# Patient Record
Sex: MALE | Race: WHITE | NOT HISPANIC OR LATINO | Employment: OTHER | URBAN - METROPOLITAN AREA
[De-identification: names, ages, dates, MRNs, and addresses within clinical notes are randomized per-mention and may not be internally consistent; named-entity substitution may affect disease eponyms.]

---

## 2023-01-31 ENCOUNTER — APPOINTMENT (INPATIENT)
Dept: RADIOLOGY | Facility: HOSPITAL | Age: 73
End: 2023-01-31

## 2023-01-31 ENCOUNTER — APPOINTMENT (EMERGENCY)
Dept: RADIOLOGY | Facility: HOSPITAL | Age: 73
End: 2023-01-31

## 2023-01-31 ENCOUNTER — HOSPITAL ENCOUNTER (INPATIENT)
Facility: HOSPITAL | Age: 73
LOS: 4 days | Discharge: HOME | End: 2023-02-04
Attending: EMERGENCY MEDICINE | Admitting: INTERNAL MEDICINE

## 2023-01-31 ENCOUNTER — APPOINTMENT (INPATIENT)
Dept: NON INVASIVE DIAGNOSTICS | Facility: HOSPITAL | Age: 73
End: 2023-01-31

## 2023-01-31 DIAGNOSIS — S22.43XA MULTIPLE FRACTURES OF RIBS, BILATERAL, INITIAL ENCOUNTER FOR CLOSED FRACTURE: ICD-10-CM

## 2023-01-31 DIAGNOSIS — R00.0 WIDE-COMPLEX TACHYCARDIA: ICD-10-CM

## 2023-01-31 DIAGNOSIS — Z95.5 STATUS POST INSERTION OF DRUG ELUTING CORONARY ARTERY STENT: Primary | ICD-10-CM

## 2023-01-31 DIAGNOSIS — I46.9 CARDIAC ARREST (HCC): ICD-10-CM

## 2023-01-31 DIAGNOSIS — F19.90 SUBSTANCE USE DISORDER: ICD-10-CM

## 2023-01-31 DIAGNOSIS — I25.10 CORONARY ARTERY DISEASE: ICD-10-CM

## 2023-01-31 PROBLEM — T18.2XXA FOREIGN BODY IN STOMACH: Status: ACTIVE | Noted: 2023-01-31

## 2023-01-31 PROBLEM — I50.9 ACUTE EXACERBATION OF CHF (CONGESTIVE HEART FAILURE) (HCC): Status: ACTIVE | Noted: 2023-01-31

## 2023-01-31 PROBLEM — G93.41 METABOLIC ENCEPHALOPATHY: Status: ACTIVE | Noted: 2023-01-31

## 2023-01-31 PROBLEM — J96.01 ACUTE HYPOXEMIC RESPIRATORY FAILURE (HCC): Status: ACTIVE | Noted: 2023-01-31

## 2023-01-31 PROBLEM — R77.8 ELEVATED TROPONIN: Status: ACTIVE | Noted: 2023-01-31

## 2023-01-31 LAB
2HR DELTA HS TROPONIN: 143 NG/L
2HR DELTA HS TROPONIN: 29 NG/L
4HR DELTA HS TROPONIN: 232 NG/L
4HR DELTA HS TROPONIN: 56 NG/L
ALBUMIN SERPL BCP-MCNC: 4.4 G/DL (ref 3.5–5)
ALP SERPL-CCNC: 103 U/L (ref 46–116)
ALT SERPL W P-5'-P-CCNC: 37 U/L (ref 12–78)
AMPHETAMINES SERPL QL SCN: NEGATIVE
ANION GAP SERPL CALCULATED.3IONS-SCNC: 6 MMOL/L (ref 4–13)
AORTIC ROOT: 3.5 CM
APAP SERPL-MCNC: <2 UG/ML (ref 10–20)
APICAL FOUR CHAMBER EJECTION FRACTION: 33 %
APTT PPP: 24 SECONDS (ref 23–37)
ASCENDING AORTA: 3.1 CM
AST SERPL W P-5'-P-CCNC: 39 U/L (ref 5–45)
ATRIAL RATE: 57 BPM
ATRIAL RATE: 94 BPM
BARBITURATES UR QL: NEGATIVE
BASE EX.OXY STD BLDV CALC-SCNC: 51.2 % (ref 60–80)
BASE EX.OXY STD BLDV CALC-SCNC: 86.7 % (ref 60–80)
BASE EX.OXY STD BLDV CALC-SCNC: 88.2 % (ref 60–80)
BASE EXCESS BLDV CALC-SCNC: -1 MMOL/L
BASE EXCESS BLDV CALC-SCNC: -8.3 MMOL/L
BASE EXCESS BLDV CALC-SCNC: 0.3 MMOL/L
BASOPHILS # BLD AUTO: 0.11 THOUSANDS/ÂΜL (ref 0–0.1)
BASOPHILS NFR BLD AUTO: 1 % (ref 0–1)
BENZODIAZ UR QL: NEGATIVE
BILIRUB SERPL-MCNC: 0.5 MG/DL (ref 0.2–1)
BUN SERPL-MCNC: 13 MG/DL (ref 5–25)
CALCIUM SERPL-MCNC: 8.8 MG/DL (ref 8.3–10.1)
CARDIAC TROPONIN I PNL SERPL HS: 162 NG/L
CARDIAC TROPONIN I PNL SERPL HS: 19 NG/L
CARDIAC TROPONIN I PNL SERPL HS: 251 NG/L
CARDIAC TROPONIN I PNL SERPL HS: 284 NG/L
CARDIAC TROPONIN I PNL SERPL HS: 313 NG/L
CARDIAC TROPONIN I PNL SERPL HS: 340 NG/L
CHLORIDE SERPL-SCNC: 105 MMOL/L (ref 96–108)
CO2 SERPL-SCNC: 27 MMOL/L (ref 21–32)
COCAINE UR QL: POSITIVE
CREAT SERPL-MCNC: 1.04 MG/DL (ref 0.6–1.3)
D DIMER PPP FEU-MCNC: 1.14 UG/ML FEU
E WAVE DECELERATION TIME: 176 MS
EOSINOPHIL # BLD AUTO: 0.7 THOUSAND/ÂΜL (ref 0–0.61)
EOSINOPHIL NFR BLD AUTO: 6 % (ref 0–6)
ERYTHROCYTE [DISTWIDTH] IN BLOOD BY AUTOMATED COUNT: 13.6 % (ref 11.6–15.1)
ERYTHROCYTE [DISTWIDTH] IN BLOOD BY AUTOMATED COUNT: 13.6 % (ref 11.6–15.1)
ETHANOL SERPL-MCNC: <3 MG/DL (ref 0–3)
FRACTIONAL SHORTENING: 11 % (ref 28–44)
GFR SERPL CREATININE-BSD FRML MDRD: 71 ML/MIN/1.73SQ M
GLUCOSE SERPL-MCNC: 121 MG/DL (ref 65–140)
GLUCOSE SERPL-MCNC: 137 MG/DL (ref 65–140)
GLUCOSE SERPL-MCNC: 202 MG/DL (ref 65–140)
GLUCOSE SERPL-MCNC: 313 MG/DL (ref 65–140)
HCO3 BLDV-SCNC: 24.8 MMOL/L (ref 24–30)
HCO3 BLDV-SCNC: 27.8 MMOL/L (ref 24–30)
HCO3 BLDV-SCNC: 28.3 MMOL/L (ref 24–30)
HCT VFR BLD AUTO: 41.9 % (ref 36.5–49.3)
HCT VFR BLD AUTO: 48.8 % (ref 36.5–49.3)
HGB BLD-MCNC: 13.6 G/DL (ref 12–17)
HGB BLD-MCNC: 15.3 G/DL (ref 12–17)
IMM GRANULOCYTES # BLD AUTO: 0.26 THOUSAND/UL (ref 0–0.2)
IMM GRANULOCYTES NFR BLD AUTO: 2 % (ref 0–2)
INR PPP: 0.98 (ref 0.84–1.19)
INTERVENTRICULAR SEPTUM IN DIASTOLE (PARASTERNAL SHORT AXIS VIEW): 1.1 CM
INTERVENTRICULAR SEPTUM: 1.1 CM (ref 0.6–1.1)
LAAS-AP2: 21.9 CM2
LAAS-AP4: 13.3 CM2
LACTATE SERPL-SCNC: 1 MMOL/L (ref 0.5–2)
LACTATE SERPL-SCNC: 4 MMOL/L (ref 0.5–2)
LEFT ATRIUM SIZE: 3.1 CM
LEFT INTERNAL DIMENSION IN SYSTOLE: 5.5 CM (ref 2.1–4)
LEFT VENTRICLE DIASTOLIC VOLUME (MOD BIPLANE): 187 ML
LEFT VENTRICLE SYSTOLIC VOLUME (MOD BIPLANE): 115 ML
LEFT VENTRICULAR INTERNAL DIMENSION IN DIASTOLE: 6.2 CM (ref 3.5–6)
LEFT VENTRICULAR POSTERIOR WALL IN END DIASTOLE: 1 CM
LEFT VENTRICULAR STROKE VOLUME: 47 ML
LV EF: 38 %
LVSV (TEICH): 47 ML
LYMPHOCYTES # BLD AUTO: 4.48 THOUSANDS/ÂΜL (ref 0.6–4.47)
LYMPHOCYTES NFR BLD AUTO: 38 % (ref 14–44)
MCH RBC QN AUTO: 31.1 PG (ref 26.8–34.3)
MCH RBC QN AUTO: 31.4 PG (ref 26.8–34.3)
MCHC RBC AUTO-ENTMCNC: 31.4 G/DL (ref 31.4–37.4)
MCHC RBC AUTO-ENTMCNC: 32.5 G/DL (ref 31.4–37.4)
MCV RBC AUTO: 97 FL (ref 82–98)
MCV RBC AUTO: 99 FL (ref 82–98)
METHADONE UR QL: NEGATIVE
MONOCYTES # BLD AUTO: 0.85 THOUSAND/ÂΜL (ref 0.17–1.22)
MONOCYTES NFR BLD AUTO: 7 % (ref 4–12)
MV E'TISSUE VEL-SEP: 6 CM/S
MV PEAK A VEL: 0.69 M/S
MV PEAK E VEL: 61 CM/S
MV STENOSIS PRESSURE HALF TIME: 51 MS
MV VALVE AREA P 1/2 METHOD: 4.31 CM2
NEUTROPHILS # BLD AUTO: 5.41 THOUSANDS/ÂΜL (ref 1.85–7.62)
NEUTS SEG NFR BLD AUTO: 46 % (ref 43–75)
NRBC BLD AUTO-RTO: 0 /100 WBCS
O2 CT BLDV-SCNC: 11.7 ML/DL
O2 CT BLDV-SCNC: 17.2 ML/DL
O2 CT BLDV-SCNC: 17.8 ML/DL
OPIATES UR QL SCN: NEGATIVE
OXYCODONE+OXYMORPHONE UR QL SCN: NEGATIVE
P AXIS: 51 DEGREES
P AXIS: 65 DEGREES
PCO2 BLDV: 60.4 MM HG (ref 42–50)
PCO2 BLDV: 64.8 MM HG (ref 42–50)
PCO2 BLDV: 91.5 MM HG (ref 42–50)
PCP UR QL: NEGATIVE
PH BLDV: 7.05 [PH] (ref 7.3–7.4)
PH BLDV: 7.25 [PH] (ref 7.3–7.4)
PH BLDV: 7.29 [PH] (ref 7.3–7.4)
PLATELET # BLD AUTO: 147 THOUSANDS/UL (ref 149–390)
PLATELET # BLD AUTO: 189 THOUSANDS/UL (ref 149–390)
PMV BLD AUTO: 10.8 FL (ref 8.9–12.7)
PMV BLD AUTO: 11.1 FL (ref 8.9–12.7)
PO2 BLDV: 39.4 MM HG (ref 35–45)
PO2 BLDV: 60.1 MM HG (ref 35–45)
PO2 BLDV: 61.8 MM HG (ref 35–45)
POTASSIUM SERPL-SCNC: 4.3 MMOL/L (ref 3.5–5.3)
PR INTERVAL: 166 MS
PR INTERVAL: 166 MS
PROCALCITONIN SERPL-MCNC: 0.22 NG/ML
PROT SERPL-MCNC: 7.6 G/DL (ref 6.4–8.4)
PROTHROMBIN TIME: 13.2 SECONDS (ref 11.6–14.5)
QRS AXIS: -18 DEGREES
QRS AXIS: -25 DEGREES
QRSD INTERVAL: 92 MS
QRSD INTERVAL: 98 MS
QT INTERVAL: 344 MS
QT INTERVAL: 506 MS
QTC INTERVAL: 430 MS
QTC INTERVAL: 492 MS
RBC # BLD AUTO: 4.33 MILLION/UL (ref 3.88–5.62)
RBC # BLD AUTO: 4.92 MILLION/UL (ref 3.88–5.62)
RIGHT ATRIUM AREA SYSTOLE A4C: 17.9 CM2
RIGHT VENTRICLE ID DIMENSION: 4.5 CM
SALICYLATES SERPL-MCNC: 3 MG/DL (ref 3–20)
SL CV LEFT ATRIUM LENGTH A2C: 4.9 CM
SL CV LV EF: 25
SL CV PED ECHO LEFT VENTRICLE DIASTOLIC VOLUME (MOD BIPLANE) 2D: 196 ML
SL CV PED ECHO LEFT VENTRICLE SYSTOLIC VOLUME (MOD BIPLANE) 2D: 148 ML
SODIUM SERPL-SCNC: 138 MMOL/L (ref 135–147)
T WAVE AXIS: 141 DEGREES
T WAVE AXIS: 79 DEGREES
THC UR QL: POSITIVE
TRICUSPID ANNULAR PLANE SYSTOLIC EXCURSION: 2.4 CM
VENTRICULAR RATE: 57 BPM
VENTRICULAR RATE: 94 BPM
WBC # BLD AUTO: 11.81 THOUSAND/UL (ref 4.31–10.16)
WBC # BLD AUTO: 12.12 THOUSAND/UL (ref 4.31–10.16)

## 2023-01-31 RX ORDER — INSULIN LISPRO 100 [IU]/ML
1-6 INJECTION, SOLUTION INTRAVENOUS; SUBCUTANEOUS
Status: DISCONTINUED | OUTPATIENT
Start: 2023-01-31 | End: 2023-02-04 | Stop reason: HOSPADM

## 2023-01-31 RX ORDER — HEPARIN SODIUM 10000 [USP'U]/100ML
3-20 INJECTION, SOLUTION INTRAVENOUS
Status: DISCONTINUED | OUTPATIENT
Start: 2023-01-31 | End: 2023-02-01

## 2023-01-31 RX ORDER — ASPIRIN 81 MG/1
81 TABLET, CHEWABLE ORAL DAILY
Status: DISCONTINUED | OUTPATIENT
Start: 2023-02-01 | End: 2023-02-04 | Stop reason: HOSPADM

## 2023-01-31 RX ORDER — HEPARIN SODIUM 1000 [USP'U]/ML
2000 INJECTION, SOLUTION INTRAVENOUS; SUBCUTANEOUS EVERY 6 HOURS PRN
Status: DISCONTINUED | OUTPATIENT
Start: 2023-01-31 | End: 2023-02-01

## 2023-01-31 RX ORDER — NOREPINEPHRINE BITARTRATE 1 MG/ML
INJECTION, SOLUTION INTRAVENOUS
Status: DISCONTINUED
Start: 2023-01-31 | End: 2023-01-31 | Stop reason: WASHOUT

## 2023-01-31 RX ORDER — HEPARIN SODIUM 1000 [USP'U]/ML
4000 INJECTION, SOLUTION INTRAVENOUS; SUBCUTANEOUS EVERY 6 HOURS PRN
Status: DISCONTINUED | OUTPATIENT
Start: 2023-01-31 | End: 2023-02-01

## 2023-01-31 RX ORDER — SODIUM CHLORIDE, SODIUM GLUCONATE, SODIUM ACETATE, POTASSIUM CHLORIDE, MAGNESIUM CHLORIDE, SODIUM PHOSPHATE, DIBASIC, AND POTASSIUM PHOSPHATE .53; .5; .37; .037; .03; .012; .00082 G/100ML; G/100ML; G/100ML; G/100ML; G/100ML; G/100ML; G/100ML
1000 INJECTION, SOLUTION INTRAVENOUS ONCE
Status: COMPLETED | OUTPATIENT
Start: 2023-01-31 | End: 2023-01-31

## 2023-01-31 RX ORDER — INSULIN LISPRO 100 [IU]/ML
1-6 INJECTION, SOLUTION INTRAVENOUS; SUBCUTANEOUS EVERY 6 HOURS SCHEDULED
Status: DISCONTINUED | OUTPATIENT
Start: 2023-01-31 | End: 2023-02-01

## 2023-01-31 RX ORDER — ACETAMINOPHEN 325 MG/1
650 TABLET ORAL EVERY 6 HOURS PRN
Status: DISCONTINUED | OUTPATIENT
Start: 2023-01-31 | End: 2023-02-03

## 2023-01-31 RX ORDER — ATORVASTATIN CALCIUM 40 MG/1
40 TABLET, FILM COATED ORAL
Status: DISCONTINUED | OUTPATIENT
Start: 2023-01-31 | End: 2023-02-01

## 2023-01-31 RX ORDER — HEPARIN SODIUM 5000 [USP'U]/ML
5000 INJECTION, SOLUTION INTRAVENOUS; SUBCUTANEOUS EVERY 8 HOURS SCHEDULED
Status: DISCONTINUED | OUTPATIENT
Start: 2023-01-31 | End: 2023-01-31

## 2023-01-31 RX ORDER — CHLORHEXIDINE GLUCONATE 0.12 MG/ML
15 RINSE ORAL EVERY 12 HOURS SCHEDULED
Status: DISCONTINUED | OUTPATIENT
Start: 2023-01-31 | End: 2023-02-02

## 2023-01-31 RX ORDER — ASPIRIN 325 MG
325 TABLET ORAL ONCE
Status: COMPLETED | OUTPATIENT
Start: 2023-01-31 | End: 2023-01-31

## 2023-01-31 RX ORDER — HEPARIN SODIUM 1000 [USP'U]/ML
4000 INJECTION, SOLUTION INTRAVENOUS; SUBCUTANEOUS ONCE
Status: COMPLETED | OUTPATIENT
Start: 2023-01-31 | End: 2023-01-31

## 2023-01-31 RX ORDER — CARVEDILOL 6.25 MG/1
6.25 TABLET ORAL 2 TIMES DAILY WITH MEALS
Status: DISCONTINUED | OUTPATIENT
Start: 2023-01-31 | End: 2023-02-04 | Stop reason: HOSPADM

## 2023-01-31 RX ADMIN — CHLORHEXIDINE GLUCONATE 0.12% ORAL RINSE 15 ML: 1.2 LIQUID ORAL at 15:09

## 2023-01-31 RX ADMIN — AMIODARONE HYDROCHLORIDE 1 MG/MIN: 50 INJECTION, SOLUTION INTRAVENOUS at 10:29

## 2023-01-31 RX ADMIN — IOHEXOL 85 ML: 350 INJECTION, SOLUTION INTRAVENOUS at 10:13

## 2023-01-31 RX ADMIN — HEPARIN SODIUM 5000 UNITS: 5000 INJECTION INTRAVENOUS; SUBCUTANEOUS at 15:09

## 2023-01-31 RX ADMIN — PERFLUTREN 1 ML/MIN: 6.52 INJECTION, SUSPENSION INTRAVENOUS at 14:45

## 2023-01-31 RX ADMIN — HEPARIN SODIUM 4000 UNITS: 1000 INJECTION INTRAVENOUS; SUBCUTANEOUS at 18:00

## 2023-01-31 RX ADMIN — HEPARIN SODIUM 12 UNITS/KG/HR: 10000 INJECTION, SOLUTION INTRAVENOUS at 18:00

## 2023-01-31 RX ADMIN — ATORVASTATIN CALCIUM 40 MG: 40 TABLET, FILM COATED ORAL at 17:04

## 2023-01-31 RX ADMIN — CARVEDILOL 6.25 MG: 6.25 TABLET, FILM COATED ORAL at 17:37

## 2023-01-31 RX ADMIN — CEFTRIAXONE 1000 MG: 1 INJECTION, POWDER, FOR SOLUTION INTRAMUSCULAR; INTRAVENOUS at 17:37

## 2023-01-31 RX ADMIN — SODIUM CHLORIDE, SODIUM GLUCONATE, SODIUM ACETATE, POTASSIUM CHLORIDE, MAGNESIUM CHLORIDE, SODIUM PHOSPHATE, DIBASIC, AND POTASSIUM PHOSPHATE 1000 ML: .53; .5; .37; .037; .03; .012; .00082 INJECTION, SOLUTION INTRAVENOUS at 09:56

## 2023-01-31 RX ADMIN — ASPIRIN 325 MG ORAL TABLET 325 MG: 325 PILL ORAL at 17:37

## 2023-01-31 NOTE — ED PROCEDURE NOTE
Procedure  POC Cardiac US    Date/Time: 1/31/2023 9:45 AM  Performed by: Elo Miller MD  Authorized by: Elo Miller MD     Patient location:  ED  Other Assisting Provider: Yes (comment) (Dr Gil Zavala)    Procedure details:     Exam Type:  Diagnostic    Indications: suspected volume depletion and cardiac arrest      Assessment / Evaluation for: cardiac function, pericardial effusion and intravascular volume status      Exam Type: initial exam      Image quality: limited diagnostic      Image availability:  Images available in PACS  Patient Details:     Cardiac Rhythm:  Regular    Mechanical ventilation: No    Cardiac findings:     Echo technique: limited 2D      Views obtained: parasternal long axis, parasternal short axis, subcostal and apical      Pericardial effusion: absent      Tamponade physiology: absent      Wall motion: hypodynamic      LV systolic function: depressed      RV dilation: none    Pulmonary findings:     Left Lung Findings: left lung sliding      Right lung findings: right lung sliding      B-lines: more than 3    IVC findings:     IVC Size: small      IVC Inspiratory Collapse comment:  Complete    Maximum IVC Diameter (cm):  1 7    Minimum IVC Diameter (cm):  0    IVC Variability (%):  100  Interpretation:     Fluid Status:  Hypovolemic                     Elo Miller MD  01/31/23 4738

## 2023-01-31 NOTE — H&P
H&P Exam - [address]  71 y o  male MRN: [id number]  Unit/Bed#: ED 22 Encounter: [id number]      -------------------------------------------------------------------------------------------------------------  Chief Complaint: found unresponsive    History of Present Illness   HX and PE limited by: none  Cory Barcenas  is a 71 y o  male with PMH of HTN, Bell's palsy, recurrent MDD, PTSD who presents after being found down at home  Patient was found unresponsive at home by his wife - maximum down time is 15 minutes  She started CPR at the direction of the   Eventually, EMS arrived and resumed CPR  Patient was intubated with a Johnathon airway in the field and given Narcan without effect  It is not documented when ROSC was obtained prior to arrival, but by the time he arrived in the ED, patient was awake and alert and attempting to extubate himself  It was noted that the patient had an unspecified run of wide complex tachycardia in the field (strips printed and available in media)  By time of arrival, patient was in normal sinus rhythm and started on an amiodarone infusion without bolus  He was weaned to 8L [address]  Patient awakens to verbal stimulation and participates with review of systems, although he does frequently fall asleep during conversation  He does have some amnesia related to the event  He does not remember any precipitating symptoms prior to losing consciousness (palpitations, chest pain, dyspnea)  He has no history of MI, HTN, HLD, DM2, CVA; however he does have a strong family history of CAD in both parents  He denies any baseline orthopnea, PND, exertional chest pain, exertional dyspnea  Once hemodynamically stable, workup in the ED revealed: normal CMP, lactate 4 0 > 1 0, WBC 11 81, Hb 15 3  VBG 7 05/91 5/39 4 improved to 7 25/64 8/61 8 with [address]  Hs troponin 19 > 162  ECG on arrival shows normal sinus rhythm without evidence of acute ischemia   CT head was negative for acute intracranial abnormalities  CTA PE (obtained due to elevated D dimer) showed no PE, bilateral anterior rib fractures in ribs 2-6 with trace anterior right pneumothorax subjacent to a rib fracture  Also revealed RUL pneumonia likely due to aspiration and evidence of pulmonary edema with GGO in RUL related to edema, infection or atelectasis  Of note, CTA showed metallic materail in the lower esophagus, but the patient does not admit to ingesting any other substances prior to his arrest  His UDS is positive for THC and cocaine      History obtained from admitting attending physician, spouse and the patient   -------------------------------------------------------------------------------------------------------------  Assessment and Plan:    Neuro:   • Diagnosis: Acute toxic metabolic encephalopathy  o Patient is somnolent but arousable on exam on admission (frequently falling asleep during conversation)  o Did not respond to Narcan in the field  o UDS positive for THC and Cocaine on admission  o S/p cardiac arrest with amnesia to the event  o Acidotic on arrival with improving VBG on supplemental O2  o Monitor mental status  • Diagnosis: Mood disorder  o Hold home Abilify, Zoloft and Wellbutrin for now given critical condition  o Resume once more stable      CV:   • Diagnosis: Cardiac arrest  o Patient presented after cardiac arrest at home  o ROSC in the field  o Rhythm strips from EMS showing wide complex tachycardia (see below)  o Unclear what rhythm patient was in during arrest, but no medications or defibrillations were given per chart review  o ECG on admission showing inferior Q waves and new T wave inversions in the anteroseptal leads on repeat  o TTE on admission shows:  - LVEF 25%, severely reduced systolic function  - Y0QM  - Moderate global hypokinesis  o Etiology may be primary arrhythmogenic event vs ACS vs hypoxia related to evident pneumonia  o Cardiology consulted, appreciate recommendations  o Start aspirin 81mg daily, statin  o Start Coreg per Cardiology  o Continue amiodarone infusion  o Cardiac catheterization tomorrow  • Diagnosis: Wide complex tachycardia  o Present during transport  o See rhythm strips provided by EMS  o Cardiology consulted, appreciate recommendations  • Diagnosis: Heart failure with reduced EF  o Patient with new reduced EF 25% on TTE on admission  o In setting of recent cardiac arrest  o Cardiology following, appreciate recommendations  o Likely a component of myocardial stunning  o Will obtain cardiac catheterization per Cardiology and monitor  o GDMT as able  - Starting Coreg now  - Holding off on ACEi/ARB/ARNI until cath  - No evidence of acute exacerbation; avoid diuresis at this time  - No need for aldosterone antagonism at this time  - Consider SGLT2i after cath  • Obtain A1c in AM  o Strict I/O  o Daily standing weights      Pulm:  • Diagnosis: Acute hypoxic hypercapnic respiratory failure  o Patient intubated in the field during cardiac arrest resuscitation  o Was able to extubate upon arrival to ED  o Now saturating 92% on 9L [address]  o Goal SpO2 as close to 100% as possible given pneumothorax (see below)  o Wean supplemental O2 as tolerated  o Incentive spirometry  • Diagnosis: Aspiration event  o CTA PE on admission shows: interlobular septal thickening likely represents pulmonary edema   Ground glass opacity in the medial right upper lobe may be related to edema, infection or atelectasis  o Likely aspiration component during cardiac arrest  o See A/P below for pneumonia  o Aspiration precautions  • Diagnosis: Pneumonia  o CTA PE on admission shows: right upper lobe pneumonia, likely due to aspiration  o Patient with mild leukocytosis on admission  o Requiring increased supplemental O2 (92% on 9L [address]) for several hours after extubation  o BCx obtained on admission  o Start Rocephin 1g q24h  o Trend fever curve & CBC  o Can broaden antibiotics if needed  o Monitor hemodynamics closely  • Diagnosis: Rib fracture  o Present on CTA PE on admission  o Anterior rib fractures ribs 2-6 bilaterally  o Due to chest compressions  o Maintain adequate pain control to promote proper respiration  o Incentive spirometry as tolerated  • Diagnosis: Pneumothorax  o CTA PE on admission shows trace anterior right pneumothorax subjacent to a rib fracture  o Patient in no acute distress and hemodynamically stable on 9L [address]  o Wean supplemental O2 as tolerated to keep SpO2 to 100% to promote resolution of PTX  o STAT CXR if patient suddenly requires increased O2 or becomes hemodynamically unstable      GI:   • Diagnosis: Foreign body  o CTA PE on admission shows: metallic material in the lower esophagus; finding may represent foreign body  o Patient only with remote history of hernia repair  o Concern for ingestion of ilicit substance that may have precipitated cardiac arrest  o Will get CXR and KUB to attempt to further identify this object  o Hold off having GI retrieve object at this time, will reassess after cardiac catheterization  o NPO at midnight, otherwise cardiac diet      :   • Diagnosis: No active issues  o Kidney function normal at baseline on admission  o Pending cardiac catheterization tomorrow  o Monitor daily  o Avoid nephrotoxic agents      F/E/N:   • F - no continuous IVF, on heparin and amio infusions  • E - replete as needed to keep K>4, P>3, Mg>2  • N - cardiac diet, NPO at midnight for cath      Heme/Onc:   • Diagnosis: No active issues  o On heparin drip until ACS is ruled out as etiology for cardiac arrest  o Daily CBC  o Monitor for signs of active bleeding      Endo:   • Diagnosis: No active issues  o SSI inpatient  o No history of endocrine abnormalities      ID:   • Diagnosis: Right upper lobe pneumonia  o Present on CTA PE on admission  o Likely due to aspiration during cardiac arrest  o Follow blood cultures from admission  o Follow up procal  o Start Rocephin 1g Q24h  o Trend fever curve/WBC  o Can broaden antibiotics if clinically indicated  o Hold off on ID consult fornow      MSK/Skin:   • Diagnosis: Psoriasis  o On Humira (Adalimumab) outpatient  o Hold inpatient  o Betamethasone or hydrocortisone cream for psoriasis as needed      Disposition: Admit to Stepdown Level 1  Code Status: No Order  --------------------------------------------------------------------------------------------------------------  Review of Systems    A 12-point, complete review of systems was reviewed and negative except as stated above     Physical Exam  Vitals and nursing note reviewed  Constitutional:       General: He is not in acute distress  Appearance: He is well-developed  HENT:      Head: Normocephalic and atraumatic  Eyes:      Conjunctiva/sclera: Conjunctivae normal    Cardiovascular:      Rate and Rhythm: Normal rate and regular rhythm  Heart sounds: S1 normal and S2 normal  No murmur heard  Pulmonary:      Effort: Pulmonary effort is normal  No respiratory distress  Breath sounds: Rales present  Comments: Minimal coarse breath sounds bilaterally  Abdominal:      General: Bowel sounds are normal  There is no distension  Palpations: Abdomen is soft  Tenderness: There is no abdominal tenderness  Musculoskeletal:         General: No swelling  Cervical back: Neck supple  Right lower leg: No edema  Left lower leg: No edema  Skin:     General: Skin is warm and dry  Capillary Refill: Capillary refill takes less than 2 seconds  Neurological:      General: No focal deficit present  Mental Status: He is oriented to person, place, and time   He is lethargic          --------------------------------------------------------------------------------------------------------------  Vitals:   Vitals:    01/31/23 0853 01/31/23 0900 01/31/23 0915 01/31/23 1030   BP: (!) 212/131  (!) 187/101 166/89   BP Location: Right arm    Pulse: 92  78 81   Resp: (!) 24  (!) 25 (!) 23   Temp:  97 7 °F (36 5 °C)     TempSrc:  Rectal     SpO2: 93%  99% 91%     Temp  Min: 97 7 °F (36 5 °C)  Max: 97 7 °F (36 5 °C)        There is no height or weight on file to calculate BMI  SvO2:       Laboratory and Diagnostics:  Results from last 7 days   Lab Units 23  0858   WBC Thousand/uL 11 81*   HEMOGLOBIN g/dL 15 3   HEMATOCRIT % 48 8   PLATELETS Thousands/uL 189   NEUTROS PCT % 46   MONOS PCT % 7     Results from last 7 days   Lab Units 23  0858   SODIUM mmol/L 138   POTASSIUM mmol/L 4 3   CHLORIDE mmol/L 105   CO2 mmol/L 27   ANION GAP mmol/L 6   BUN mg/dL 13   CREATININE mg/dL 1 04   CALCIUM mg/dL 8 8   GLUCOSE RANDOM mg/dL 202*   ALT U/L 37   AST U/L 39   ALK PHOS U/L 103   ALBUMIN g/dL 4 4   TOTAL BILIRUBIN mg/dL 0 50                   Results from last 7 days   Lab Units 23  0858   LACTIC ACID mmol/L 4 0*     ABG:    VBG:  Results from last 7 days   Lab Units 23  0858   PH YVES  7 051*   PCO2 YVES mm Hg 91 5*   PO2 YVES mm Hg 39 4   HCO3 YVES mmol/L 24 8   BASE EXC YVES mmol/L -8 3           Micro:        EK/31  Sinus bradycardia  Inferior infarct (cited on or before 2023)  ST & T wave abnormality, consider anterolateral ischemia  Abnormal ECG  When compared with ECG of 2023 08:55,  Vent  rate has decreased BY  37 BPM  Serial changes of Inferior infarct Present  Confirmed by Areli Armas ([id number]) on 2023 3:29:35 PM    Imaging: I have personally reviewed pertinent reports  Historical Information   Past Medical History:   Diagnosis Date   • Depression      History reviewed  No pertinent surgical history    Social History   Social History     Substance and Sexual Activity   Alcohol Use Not Currently     Social History     Substance and Sexual Activity   Drug Use Not Currently     Social History     Tobacco Use   Smoking Status Former   • Types: Cigarettes   Smokeless Tobacco Never       Family History:   History reviewed  No pertinent family history  I have reviewed this patient's family history and commented on sigificant items within the HPI      Medications:  Current Facility-Administered Medications   Medication Dose Route Frequency   • amiodarone (CORDARONE) 900 mg in dextrose 5 % 500 mL infusion  1 mg/min Intravenous Continuous     Home medications:  None     Allergies:  No Known Allergies  ------------------------------------------------------------------------------------------------------------  Advance Directive and Living Will:      Power of :    POLST:    ------------------------------------------------------------------------------------------------------------  Anticipated Length of Stay is > 2 midnights    Care Time Delivered:   Upon my evaluation, this patient had a high probability of imminent or life-threatening deterioration due to cardiac arrest, acute hypoxic hypercapnic respiratory failure, pneumonia, which required my direct attention, intervention, and personal management  I have personally provided 60 minutes (1200 to 1300) of critical care time, exclusive of procedures, teaching, family meetings, and any prior time recorded by providers other than myself  Vinicius Bower, DO        Portions of the record may have been created with voice recognition software  Occasional wrong word or "sound a like" substitutions may have occurred due to the inherent limitations of voice recognition software    Read the chart carefully and recognize, using context, where substitutions have occurred

## 2023-01-31 NOTE — ED NOTES
Pt intubated and being bagged on arrival  Becoming alert and moving   Respiratory at bedside        Immanuel Burrows  01/31/23 0972

## 2023-01-31 NOTE — ED PROVIDER NOTES
History  Chief Complaint   Patient presents with   • Respiratory Distress     Pt post cardiac arrest, wife believes pt took multiple medications and was found down 20 min later without pulses, CPR performed for [place]  5-7 min by EMS, intubated on arrival     HPI  64-year-old male presents as a respiratory/cardiac arrest status post ROSC  Was found by his wife behind the shed in his backyard unresponsive  Earlier the patient was in argument with his wife and then later was found behind the shed  Patient has history of opioid abuse and nurses found drug paraphernalia in the patient's pockets  CPR was started by the patient's wife and EMS continued CPR and administered Narcan  Patient got ROSC and Narcan seem to wake the patient up  Patient was intubated in the field with a Johnathon airway  On arrival patient is extubated because the patient is making purposeful movements and breathing on his own when airway has been checked  After extubation patient is able to answer questions but is amnesic to the events prior to arrival   Prior to Admission Medications   Prescriptions Last Dose Informant Patient Reported? Taking? ARIPiprazole (ABILIFY) 10 mg tablet   Yes Yes   Sig: 10 mg   buPROPion (WELLBUTRIN XL) 150 mg 24 hr tablet   Yes No   Sig: Take 150 mg by mouth   buPROPion (WELLBUTRIN XL) 300 mg 24 hr tablet   Yes Yes   Si mg   sertraline (ZOLOFT) 100 mg tablet   Yes Yes   Si mg      Facility-Administered Medications: None       Past Medical History:   Diagnosis Date   • Depression        Past Surgical History:   Procedure Laterality Date   • CARDIAC CATHETERIZATION Left 2023    Procedure: Cardiac Left Heart Cath;  Surgeon: Ashish Celaya MD;  Location: [facility]; Service: Cardiology   • CARDIAC CATHETERIZATION N/A 2023    Procedure: Cardiac pci;  Surgeon: Ashish Celaya MD;  Location: [facility];   Service: Cardiology   • CARDIAC CATHETERIZATION N/A 2023    Procedure: Cardiac Coronary Angiogram;  Surgeon: Alondra Ding MD;  Location: [facility]; Service: Cardiology       History reviewed  No pertinent family history  I have reviewed and agree with the history as documented  E-Cigarette/Vaping     E-Cigarette/Vaping Substances     Social History     Tobacco Use   • Smoking status: Former     Types: Cigarettes   • Smokeless tobacco: Never   Substance Use Topics   • Alcohol use: Not Currently   • Drug use: Not Currently        Review of Systems   Unable to perform ROS: Acuity of condition       Physical Exam  ED Triage Vitals   Temperature Pulse Respirations Blood Pressure SpO2   01/31/23 0900 01/31/23 0850 01/31/23 0853 01/31/23 0850 01/31/23 0850   97 7 °F (36 5 °C) 98 (!) 24 (!) 212/131 100 %      Temp Source Heart Rate Source Patient Position - Orthostatic VS BP Location FiO2 (%)   01/31/23 0900 01/31/23 0850 01/31/23 0915 01/31/23 0915 --   Rectal Monitor Lying Right arm       Pain Score       01/31/23 2040       No Pain             Orthostatic Vital Signs  Vitals:    02/04/23 0400 02/04/23 0728 02/04/23 1128 02/04/23 1129   BP:  157/83 115/62 115/62   Pulse: (!) 53 (!) 54 (!) 45 (!) 46   Patient Position - Orthostatic VS:           Physical Exam  Vitals reviewed  Constitutional:       General: He is not in acute distress  Appearance: He is well-developed  HENT:      Head: Normocephalic and atraumatic  Right Ear: External ear normal       Left Ear: External ear normal       Nose: Nose normal  No congestion or rhinorrhea  Mouth/Throat:      Mouth: Mucous membranes are moist       Pharynx: No oropharyngeal exudate or posterior oropharyngeal erythema  Eyes:      General:         Right eye: No discharge  Left eye: No discharge  Extraocular Movements: Extraocular movements intact  Conjunctiva/sclera: Conjunctivae normal       Pupils: Pupils are equal, round, and reactive to light     Cardiovascular:      Rate and Rhythm: Normal rate and regular rhythm  Pulses: Normal pulses  Heart sounds: Normal heart sounds  Pulmonary:      Effort: Pulmonary effort is normal  No respiratory distress  Breath sounds: Rales present  Abdominal:      Palpations: Abdomen is soft  Tenderness: There is no abdominal tenderness  Musculoskeletal:         General: No tenderness  Normal range of motion  Cervical back: Normal range of motion and neck supple  No rigidity  Skin:     Capillary Refill: Capillary refill takes less than 2 seconds  Coloration: Skin is pale  Findings: No erythema or rash  Neurological:      General: No focal deficit present  Mental Status: He is alert and oriented to person, place, and time  Cranial Nerves: No cranial nerve deficit  Sensory: No sensory deficit  Motor: No weakness        Coordination: Coordination normal          ED Medications  Medications   sodium chloride 0 9 % infusion ( Intravenous Canceled Entry 2/1/23 1817)   sodium chloride 0 9 % infusion (0 mL/hr Intravenous Stopped 2/2/23 0013)   multi-electrolyte (ISOLYTE-S PH 7 4) bolus 1,000 mL (0 mL Intravenous Stopped 1/31/23 1056)   iohexol (OMNIPAQUE) 350 MG/ML injection (SINGLE-DOSE) 85 mL (85 mL Intravenous Given 1/31/23 1013)   perflutren lipid microsphere (DEFINITY) injection (1 mL/min Intravenous Given 1/31/23 1445)   aspirin chewable tablet 324 mg (324 mg Oral Given 2/1/23 1829)   aspirin tablet 325 mg (325 mg Oral Given 1/31/23 1737)   heparin (porcine) injection 4,000 Units (4,000 Units Intravenous Given 1/31/23 1800)   potassium phosphates 30 mmol in sodium chloride 0 9 % 250 mL infusion (30 mmol Intravenous New Bag 2/2/23 1142)   potassium chloride (K-DUR,KLOR-CON) CR tablet 40 mEq (40 mEq Oral Given 2/2/23 0927)   clopidogrel (PLAVIX) tablet 600 mg (600 mg Oral Given 2/2/23 0926)   potassium chloride (K-DUR,KLOR-CON) CR tablet 40 mEq (40 mEq Oral Given 2/3/23 1743)   potassium chloride (K-DUR,KLOR-CON) CR tablet 40 mEq (40 mEq Oral Given 2/4/23 0802)       Diagnostic Studies  Results Reviewed     Procedure Component Value Units Date/Time    Blood culture [[id number]] Collected: 01/31/23 0929    Lab Status: Final result Specimen: Blood from Line, Venous Updated: 02/05/23 1301     Blood Culture No Growth After 5 Days  Blood culture [[id number]] Collected: 01/31/23 0916    Lab Status: Final result Specimen: Blood from Line, Venous Updated: 02/05/23 1301     Blood Culture No Growth After 5 Days  Rapid drug screen, urine [[id number]]  (Abnormal) Collected: 02/01/23 1138    Lab Status: Final result Specimen: Urine, Clean Catch Updated: 02/01/23 1300     Amph/Meth UR Negative     Barbiturate Ur Negative     Benzodiazepine Urine Negative     Cocaine Urine Positive     Methadone Urine Negative     Opiate Urine Positive     PCP Ur Negative     THC Urine Positive     Oxycodone Urine Negative    Narrative:      Presumptive report  If requested, specimen will be sent to reference lab for confirmation  FOR MEDICAL PURPOSES ONLY  IF CONFIRMATION NEEDED PLEASE CONTACT THE LAB WITHIN 5 DAYS      Drug Screen Cutoff Levels:  AMPHETAMINE/METHAMPHETAMINES  1000 ng/mL  BARBITURATES     200 ng/mL  BENZODIAZEPINES     200 ng/mL  COCAINE      300 ng/mL  METHADONE      300 ng/mL  OPIATES      300 ng/mL  PHENCYCLIDINE     25 ng/mL  THC       50 ng/mL  OXYCODONE      100 ng/mL    Hemoglobin A1C [[id number]] Collected: 02/01/23 0546    Lab Status: Final result Specimen: Blood from Arm, Left Updated: 02/01/23 0820     Hemoglobin A1C 5 4 %       mg/dl     Comprehensive metabolic panel [[id number]]  (Abnormal) Collected: 02/01/23 0546    Lab Status: Final result Specimen: Blood from Arm, Left Updated: 02/01/23 0654     Sodium 136 mmol/L      Potassium 3 8 mmol/L      Chloride 103 mmol/L      CO2 29 mmol/L      ANION GAP 4 mmol/L      BUN 17 mg/dL      Creatinine 0 68 mg/dL      Glucose 119 mg/dL      Calcium 8 4 mg/dL      Corrected Calcium 9 1 mg/dL      AST 46 U/L      ALT 41 U/L      Alkaline Phosphatase 66 U/L      Total Protein 6 1 g/dL      Albumin 3 1 g/dL      Total Bilirubin 0 36 mg/dL      eGFR 96 ml/min/1 73sq m     Narrative:      [place] guidelines for Chronic Kidney Disease (CKD):   •  Stage 1 with normal or high GFR (GFR > 90 mL/min/1 73 square meters)  •  Stage 2 Mild CKD (GFR = 60-89 mL/min/1 73 square meters)  •  Stage 3A Moderate CKD (GFR = 45-59 mL/min/1 73 square meters)  •  Stage 3B Moderate CKD (GFR = 30-44 mL/min/1 73 square meters)  •  Stage 4 Severe CKD (GFR = 15-29 mL/min/1 73 square meters)  •  Stage 5 End Stage CKD (GFR <15 mL/min/1 73 square meters)  Note: GFR calculation is accurate only with a steady state creatinine    Magnesium [[id number]]  (Normal) Collected: 02/01/23 0546    Lab Status: Final result Specimen: Blood from Arm, Left Updated: 02/01/23 0646     Magnesium 2 3 mg/dL     Phosphorus [[id number]]  (Normal) Collected: 02/01/23 0546    Lab Status: Final result Specimen: Blood from Arm, Left Updated: 02/01/23 0646     Phosphorus 3 1 mg/dL     Lipid Panel with Direct LDL reflex [[id number]]  (Normal) Collected: 02/01/23 0546    Lab Status: Final result Specimen: Blood from Arm, Left Updated: 02/01/23 0646     Cholesterol 197 mg/dL      Triglycerides 70 mg/dL      HDL, Direct 96 mg/dL      LDL Calculated 87 mg/dL     CBC and differential [[id number]]  (Abnormal) Collected: 02/01/23 0546    Lab Status: Final result Specimen: Blood from Arm, Left Updated: 02/01/23 0620     WBC 11 87 Thousand/uL      RBC 3 83 Million/uL      Hemoglobin 11 8 g/dL      Hematocrit 36 4 %      MCV 95 fL      MCH 30 8 pg      MCHC 32 4 g/dL      RDW 13 9 %      MPV 11 1 fL      Platelets 010 Thousands/uL      nRBC 0 /100 WBCs      Neutrophils Relative 84 %      Immat GRANS % 0 %      Lymphocytes Relative 9 %      Monocytes Relative 7 %      Eosinophils Relative 0 %      Basophils Relative 0 %      Neutrophils Absolute 9 87 Thousands/µL      Immature Grans Absolute 0 04 Thousand/uL      Lymphocytes Absolute 1 02 Thousands/µL      Monocytes Absolute 0 86 Thousand/µL      Eosinophils Absolute 0 05 Thousand/µL      Basophils Absolute 0 03 Thousands/µL     APTT [[id number]]  (Abnormal) Collected: 01/31/23 2322    Lab Status: Final result Specimen: Blood from Arm, Right Updated: 02/01/23 0109     PTT 43 seconds     HS Troponin I 4hr [[id number]]  (Abnormal) Collected: 01/31/23 1921    Lab Status: Final result Specimen: Blood from Arm, Left Updated: 01/31/23 2015     hs TnI 4hr 340 ng/L      Delta 4hr hsTnI 56 ng/L     Procalcitonin [[id number]]  (Normal) Collected: 01/31/23 1758    Lab Status: Final result Specimen: Blood from Arm, Right Updated: 01/31/23 1900     Procalcitonin 0 22 ng/ml     Blood gas, venous [[id number]]  (Abnormal) Collected: 01/31/23 1758    Lab Status: Final result Specimen: Blood from Arm, Right Updated: 01/31/23 1823     pH, Kin 7 288     pCO2, Kin 60 4 mm Hg      pO2, Kin 60 1 mm Hg      HCO3, Kin 28 3 mmol/L      Base Excess, Kin 0 3 mmol/L      O2 Content, Kin 17 2 ml/dL      O2 HGB, VENOUS 88 2 %     Fingerstick Glucose (POCT) [[id number]]  (Normal) Collected: 01/31/23 1809    Lab Status: Final result Updated: 01/31/23 1810     POC Glucose 137 mg/dl     HS Troponin I 2hr [[id number]]  (Abnormal) Collected: 01/31/23 1703    Lab Status: Final result Specimen: Blood from Arm, Right Updated: 01/31/23 1753     hs TnI 2hr 313 ng/L      Delta 2hr hsTnI 29 ng/L     APTT [[id number]]  (Normal) Collected: 01/31/23 1707    Lab Status: Final result Specimen: Blood from Arm, Right Updated: 01/31/23 1736     PTT 24 seconds     Protime-INR [[id number]]  (Normal) Collected: 01/31/23 1707    Lab Status: Final result Specimen: Blood from Arm, Right Updated: 01/31/23 1736     Protime 13 2 seconds      INR 0 98    CBC [[id number]]  (Abnormal) Collected: 01/31/23 0117    Lab Status: Final result Specimen: Blood from Arm, Right Updated: 01/31/23 1715     WBC 12 12 Thousand/uL      RBC 4 33 Million/uL      Hemoglobin 13 6 g/dL      Hematocrit 41 9 %      MCV 97 fL      MCH 31 4 pg      MCHC 32 5 g/dL      RDW 13 6 %      Platelets 304 Thousands/uL      MPV 10 8 fL     HS Troponin 0hr (reflex protocol) [[id number]]  (Abnormal) Collected: 01/31/23 1507    Lab Status: Final result Specimen: Blood from Arm, Right Updated: 01/31/23 1551     hs TnI 0hr 284 ng/L     HS Troponin I 4hr [[id number]]  (Abnormal) Collected: 01/31/23 1330    Lab Status: Final result Specimen: Blood from Arm, Right Updated: 01/31/23 1408     hs TnI 4hr 251 ng/L      Delta 4hr hsTnI 232 ng/L     Lactic acid 2 Hours [[id number]]  (Normal) Collected: 01/31/23 1159    Lab Status: Final result Specimen: Blood from Arm, Right Updated: 01/31/23 1250     LACTIC ACID 1 0 mmol/L     Narrative:      Result may be elevated if tourniquet was used during collection  Rapid drug screen, urine [[id number]]  (Abnormal) Collected: 01/31/23 1116    Lab Status: Final result Specimen: Urine, Other Updated: 01/31/23 1234     Amph/Meth UR Negative     Barbiturate Ur Negative     Benzodiazepine Urine Negative     Cocaine Urine Positive     Methadone Urine Negative     Opiate Urine Negative     PCP Ur Negative     THC Urine Positive     Oxycodone Urine Negative    Narrative:      Presumptive report  If requested, specimen will be sent to reference lab for confirmation  FOR MEDICAL PURPOSES ONLY  IF CONFIRMATION NEEDED PLEASE CONTACT THE LAB WITHIN 5 DAYS      Drug Screen Cutoff Levels:  AMPHETAMINE/METHAMPHETAMINES  1000 ng/mL  BARBITURATES     200 ng/mL  BENZODIAZEPINES     200 ng/mL  COCAINE      300 ng/mL  METHADONE      300 ng/mL  OPIATES      300 ng/mL  PHENCYCLIDINE     25 ng/mL  THC       50 ng/mL  OXYCODONE      100 ng/mL    Blood gas, venous [[id number]]  (Abnormal) Collected: 01/31/23 1159    Lab Status: Final result Specimen: Blood from Arm, Right Updated: 01/31/23 1212     pH, Kin 7 251 pCO2, Kin 64 8 mm Hg      pO2, Kin 61 8 mm Hg      HCO3, Kin 27 8 mmol/L      Base Excess, Kin -1 0 mmol/L      O2 Content, Kin 17 8 ml/dL      O2 HGB, VENOUS 86 7 %     HS Troponin I 2hr [[id number]]  (Abnormal) Collected: 01/31/23 1116    Lab Status: Final result Specimen: Blood from Arm, Right Updated: 01/31/23 1205     hs TnI 2hr 162 ng/L      Delta 2hr hsTnI 143 ng/L     Lactic acid, plasma [[id number]]  (Abnormal) Collected: 01/31/23 0858    Lab Status: Final result Specimen: Blood from Arm, Left Updated: 01/31/23 1010     LACTIC ACID 4 0 mmol/L     Narrative:      Result may be elevated if tourniquet was used during collection  Salicylate level [[id number]]  (Normal) Collected: 01/31/23 0858    Lab Status: Final result Specimen: Blood from Arm, Left Updated: 99/40/94 4119     Salicylate Lvl 3 mg/dL     Acetaminophen level-If concentration is detectable, please discuss with medical  on call   [[id number]]  (Abnormal) Collected: 01/31/23 0858    Lab Status: Final result Specimen: Blood from Arm, Left Updated: 01/31/23 0953     Acetaminophen Level <2 ug/mL     Comprehensive metabolic panel [[id number]]  (Abnormal) Collected: 01/31/23 0858    Lab Status: Final result Specimen: Blood from Arm, Left Updated: 01/31/23 0953     Sodium 138 mmol/L      Potassium 4 3 mmol/L      Chloride 105 mmol/L      CO2 27 mmol/L      ANION GAP 6 mmol/L      BUN 13 mg/dL      Creatinine 1 04 mg/dL      Glucose 202 mg/dL      Calcium 8 8 mg/dL      AST 39 U/L      ALT 37 U/L      Alkaline Phosphatase 103 U/L      Total Protein 7 6 g/dL      Albumin 4 4 g/dL      Total Bilirubin 0 50 mg/dL      eGFR 71 ml/min/1 73sq m     Narrative:      [place] guidelines for Chronic Kidney Disease (CKD):   •  Stage 1 with normal or high GFR (GFR > 90 mL/min/1 73 square meters)  •  Stage 2 Mild CKD (GFR = 60-89 mL/min/1 73 square meters)  •  Stage 3A Moderate CKD (GFR = 45-59 mL/min/1 73 square meters)  •  Stage 3B Moderate CKD (GFR = 30-44 mL/min/1 73 square meters)  •  Stage 4 Severe CKD (GFR = 15-29 mL/min/1 73 square meters)  •  Stage 5 End Stage CKD (GFR <15 mL/min/1 73 square meters)  Note: GFR calculation is accurate only with a steady state creatinine    HS Troponin 0hr (reflex protocol) [[id number]]  (Normal) Collected: 01/31/23 0858    Lab Status: Final result Specimen: Blood from Arm, Left Updated: 01/31/23 0949     hs TnI 0hr 19 ng/L     D-dimer, quantitative [[id number]]  (Abnormal) Collected: 01/31/23 0858    Lab Status: Final result Specimen: Blood from Arm, Left Updated: 01/31/23 0944     D-Dimer, Quant 1 14 ug/ml FEU     Narrative: In the evaluation for possible pulmonary embolism, in the appropriate (Well's Score of 4 or less) patient, the age adjusted d-dimer cutoff for this patient can be calculated as:    Age x 0 01 (in ug/mL) for Age-adjusted D-dimer exclusion threshold for a patient over 50 years      Ethanol [[id number]]  (Normal) Collected: 01/31/23 0858    Lab Status: Final result Specimen: Blood from Arm, Left Updated: 01/31/23 0941     Ethanol Lvl <3 mg/dL     CBC and differential [[id number]]  (Abnormal) Collected: 01/31/23 0858    Lab Status: Final result Specimen: Blood from Arm, Left Updated: 01/31/23 0917     WBC 11 81 Thousand/uL      RBC 4 92 Million/uL      Hemoglobin 15 3 g/dL      Hematocrit 48 8 %      MCV 99 fL      MCH 31 1 pg      MCHC 31 4 g/dL      RDW 13 6 %      MPV 11 1 fL      Platelets 676 Thousands/uL      nRBC 0 /100 WBCs      Neutrophils Relative 46 %      Immat GRANS % 2 %      Lymphocytes Relative 38 %      Monocytes Relative 7 %      Eosinophils Relative 6 %      Basophils Relative 1 %      Neutrophils Absolute 5 41 Thousands/µL      Immature Grans Absolute 0 26 Thousand/uL      Lymphocytes Absolute 4 48 Thousands/µL      Monocytes Absolute 0 85 Thousand/µL      Eosinophils Absolute 0 70 Thousand/µL      Basophils Absolute 0 11 Thousands/µL     Blood gas, venous [[id number]]  (Abnormal) Collected: 01/31/23 0858    Lab Status: Final result Specimen: Blood from Arm, Left Updated: 01/31/23 0910     pH, Kin 7 051     pCO2, Kin 91 5 mm Hg      pO2, Kin 39 4 mm Hg      HCO3, Kin 24 8 mmol/L      Base Excess, Kin -8 3 mmol/L      O2 Content, Kin 11 7 ml/dL      O2 HGB, VENOUS 51 2 %     Fingerstick Glucose (POCT) [[id number]]  (Abnormal) Collected: 01/31/23 0855    Lab Status: Final result Updated: 01/31/23 0856     POC Glucose 313 mg/dl                  XR abdomen 1 view kub   Final Result by Yuli Dick MD (14/16 4316)      Previous radiopaque foreign body in the left upper quadrant is now seen in the right lower quadrant possibly in the cecal region  Stool in the colon  Workstation performed: [id number]         XR abdomen 1 view kub   Final Result by Rosa Maria Fine MD (02/01 1046)      Opaque foreign body in the left upper quadrant of the abdomen is unchanged in position as of the latest study on 2/1/2023 at 4:54 AM       This could represent a tooth fragment and should be correlated with physical exam the oral cavity  Workstation performed: [id number]         XR chest portable   Final Result by Dodie Fallon MD (02/01 1507)      Right midlung opacity/consolidation, decreased from the previous study      Left lower lung opacity may be due to atelectasis or infiltrate            Workstation performed: [id number]         XR chest portable ICU   Final Result by Rosa Maria Fine MD (02/01 1040)      Foreign body appears to be in the left upper quadrant of the abdomen  Hazy density probably represents effusion  Left lower lobe atelectasis versus infiltrate                    Workstation performed: [id number]         XR abdomen 1 view kub   Final Result by Rosa Maria Fine MD (02/01 1046)      Opaque foreign body in the left upper quadrant of the abdomen is unchanged in position as of the latest study on 2/1/2023 at 4:54 AM  This could represent a tooth fragment and should be correlated with physical exam the oral cavity  Workstation performed: [id number]         CT head without contrast   Final Result by Beatriz Cardozo MD (01/31 1108)      No evidence of acute intracranial process  Chronic microangiopathy  Workstation performed: [id number]         CTA ED chest PE Study   Final Result by Poly Batista MD (01/31 1121)      1  No pulmonary embolism  2   Bilateral anterior 2nd- 6th rib fractures  Trace anterior right pneumothorax subjacent to a rib fracture  3   Right upper lobe pneumonia, likely due to aspiration  4   Interlobular septal thickening likely represents pulmonary edema  Groundglass opacity in the medial right upper lobe may be related to edema, infection or atelectasis  5   Significant bilateral posterior lower lobe segmental atelectasis  6   Metallic material in the lower esophagus  Finding may represent a foreign body  Correlation with prior procedural history is also advised  I personally discussed this study with Matilde Montes on 1/31/2023 at 11:20 AM                               Workstation performed: [id number]               Procedures  Procedures  Conscious Sedation Assessment    Flowsheet Row Classification Score   ASA Scale Assessment 3-Severe systemic disease that results in functional limitation filed at 02/01/2023 1430   Mallampati Classification Class II: soft palate, uvula, fauces visible - No Difficulty filed at 02/01/2023 1430          ED Course                                       Medical Decision Making  41-year-old male presents as a respiratory/cardiac arrest status post ROSC       Cardiac arrest and respiratory arrest likely secondary to opioid overdose unintentional  Patient was found to have rib fractures on CT PE scan secondary to CPR being done  Also shows that there is signs of aspiration on scan   Patient is oxygenating well on nasal cannula status postextubation  Amiodarone was started because patient was noticed to be in a wide complex tachycardia status post ROSC  Prior to ROSC and in the ED patient's EKG is normal sinus rhythm  Amiodarone drip was started for cardiac protection  Pt stable on NC     Pt is admitted to MICU    Amount and/or Complexity of Data Reviewed  Labs: ordered  Radiology: ordered  Risk  Prescription drug management  Decision regarding hospitalization              Disposition  Final diagnoses:   Cardiac arrest [facility])   Status post insertion of drug eluting coronary artery stent   Substance use disorder   Multiple fractures of ribs, bilateral, initial encounter for closed fracture   Coronary artery disease   Wide-complex tachycardia     Time reflects when diagnosis was documented in both MDM as applicable and the Disposition within this note     Time User Action Codes Description Comment    1/31/2023 12:39 PM Esa Gell Add [I46 9] Cardiac arrest ([facility] )     2/1/2023  6:43 AM Quenten Norbert Modify [I46 9] [facility]     2/1/2023  3:39 PM Ernst Starcher Modify [I46 9] Cardiac arrest ([facility] )     2/1/2023  3:39 PM Ernst Starcher Add [Z95 5] Status post insertion of drug eluting coronary artery stent     2/1/2023  5:21 PM [facility], [address] [place] [address] [F19 90] Substance use disorder     2/4/2023 11:32 AM Partha  Add [S22 43XA] Multiple fractures of ribs, bilateral, initial encounter for closed fracture     2/4/2023 11:48 AM Parker  Add [I25 10] Coronary artery disease     2/4/2023 11:48 AM Parker  Add [R00 0] Wide-complex tachycardia       ED Disposition     ED Disposition   Admit    Condition   Stable    Date/Time   Tue Jan 31, 2023  9:58 AM    Comment              Follow-up Information     Follow up With Specialties Details Why Contact Info Additional Information    [employer]  Follow up in 1 week(s) Call this number to establish care with a primary care provider  Schedule an appointment within 1 week   [address] Cardiology Follow up follow up within 2 weeks [address]  [address], [address] [facility], [place], [place], [address]          Discharge Medication List as of 2/4/2023 12:28 PM      START taking these medications    Details   aspirin 81 mg chewable tablet Chew 1 tablet (81 mg total) daily Do not start before February 5, 2023 , Starting Sun 2/5/2023, No Print      atorvastatin (LIPITOR) 80 mg tablet Take 1 tablet (80 mg total) by mouth daily with dinner, Starting Sat 2/4/2023, Until Mon 3/6/2023, Normal      carvedilol (COREG) 6 25 mg tablet Take 1 tablet (6 25 mg total) by mouth 2 (two) times a day with meals, Starting Sat 2/4/2023, Until Mon 3/6/2023, Normal      clopidogrel (PLAVIX) 75 mg tablet Take 1 tablet (75 mg total) by mouth daily Do not start before February 5, 2023 , Starting Sun 2/5/2023, Until Tue 3/7/2023, Normal      lidocaine (LIDODERM) 5 % Apply 1 patch topically over 12 hours daily Remove & Discard patch within 12 hours or as directed by MD Do not start before February 5, 2023 , Starting Sun 2/5/2023, Until Tue 3/7/2023, Normal      losartan (COZAAR) 25 mg tablet Take 1 tablet (25 mg total) by mouth daily Do not start before February 5, 2023 , Starting Sun 2/5/2023, Until Tue 3/7/2023, Normal      oxyCODONE (ROXICODONE) 5 immediate release tablet Take 1 tablet (5 mg total) by mouth every 4 (four) hours as needed for moderate pain Max Daily Amount: 30 mg, Starting Sat 2/4/2023, Normal         CONTINUE these medications which have NOT CHANGED    Details   ARIPiprazole (ABILIFY) 10 mg tablet 10 mg, Starting Wed 11/23/2022, Historical Med      buPROPion (WELLBUTRIN XL) 300 mg 24 hr tablet 300 mg, Starting Wed 11/23/2022, Historical Med      sertraline (ZOLOFT) 100 mg tablet 100 mg, Starting Wed 11/23/2022, Historical Med           Outpatient Discharge Orders   Ambulatory  Referral to Cardiac Rehabilitation   Standing Status: Future Standing Exp  Date: 02/01/24      Ambulatory Referral to Cardiology   Standing Status: Future Standing Exp  Date: 02/04/24      Discharge Diet     No strenuous exercise     Call provider for:  severe uncontrolled pain     Call provider for:  difficulty breathing, headache or visual disturbances     Call provider for:  persistent dizziness or light-headedness     Continue aspirin, beta blocker, antiplatelet, and statin as ordered on medication reconciliation       PDMP Review     None           ED Provider  Attending physically available and evaluated [place] Leader    CIELO managed the patient along with the ED Attending      Electronically Signed by         Ebony Rosario DO  02/12/23 1126

## 2023-01-31 NOTE — CONSULTS
Consultation - General Cardiology Team 2  Monae Mellette  71 y o  male MRN: [id number]  Unit/Bed#: ED 22 Encounter: [id number]      Inpatient consult to Cardiology  Consult performed by: Laquita Beck DO  Consult ordered by: Everton Boyd DO        PCP: No primary care provider on file  Outpatient Cardiologist: None on file    History of Present Illness   Physician Requesting Consult: Trang Tolentino MD  Reason for Consult / Principal Problem: Out        Assessment/Plan     Assessment:  #Cardiac arrest; out-of-hospital; ROSC achieved by EMS PTA afer 5-7 minutes of CPR, without use of medications or defibrillation   #Wide-complex tachycardia; as seen on rhythm strips obtained by EMS while en route to ED  #Elevated Hs-troponin's  #NSTEMI; ST depressions in lateral limb leads and anteroseptal leads in setting of chest pain, elevated trops  #Acute toxic metabolic encephalopathy  #Cardiomyopathy; reduced EF 25% on TTE on admission in setting of cardiac arrest PTA  #Acute hypoxic hypercapnic respiratory failure  #RUL PNA  #Elevated D-dimer  #Cocaine positive, on RDS  #THC positive, on RDS    Plan:  · 62-year-old male with history of depression/anxiety and PTSD on ? Abilify and ? Wellbutrin who is admitted for out-of-hospital cardiac arrest with ROSC achieved in the field after 5 to 7 minutes of CPR without the use of medications or defibrillation; rhythm strips en route to ED showed wide-complex tachycardia; UDS positive for cocaine  Initially intubated while en route using Johnathon airway d/t respiratory comprimise, however was extubated on arrival and weaned to 8L [address] after he was able to follow commands and spontaneously move all 4 extremities  Repeat ECG on admission showing inferior Q waves and T wave inversions in lateral and anteroseptal leads; TTE on admission showing LVEF 25%, G1 DD, moderate global hypokinesis     · Etiology likely multifactorial including re-entry ventricular arrhythmia in setting of prior scar tissue, likely anterior wall as seen on TTE, in setting of cocaine/stimulant use vs ACS in setting of active CP, T wave inversions, and elevated HS-trops, vs drug-drug interactions in setting of psychiatric medications and substance use  · N p o  after midnight with tentative plan for left heart catheterization tomorrow for further evaluation  · Continue amiodarone infusion until catheterization  · Start GDMT including:  · Coreg 6 25 mg twice daily; consider increasing to 12 5mg bid after catheterization  · Avoid metoprolol given UDS positive for cocaine use  · Hold ACEi/ARB/ARNI until catheterization  · No indication for diuretics this time  · Start antiplatelet tx with ASA 81 mg  · Start lipid-lowering tx with statin   · Obtain A1c and lipid panel  · Strict I/O  · Daily weights      HPI: History primarily provided by patient, family, and chart review  Rustam Haile  is a 71y o  year old male with a history of depression and anxiety managed on ? Ambilify and ? Wellbutrin who is admitted for out-of-hospital cardiac arrest being found unresponsive in a shed behind his house by wife this morning around 8 AM approximately 30 minutes after patient took his morning medications  Per chart review, patient's wife started CPR as directed by  after unable to palpate pulse or respirations on patient  Upon EMS arrival, he was noted to be in normal sinus rhythm on the monitor, however was noted to be in continued respiratory arrest, requiring BVM ventilations  While en route to ED, he was intubated with a Johnathon airway with improved end-tidal CO2 and O2 saturations, and subsequently given Narcan without notable improvement  Additionally, ECG strips while en route noted to be in wide-complex ventricular tachycardia with a pulse for which no antiarrhythmic agents were given  On ED arrival, patient was spontaneous moving all 4 extremities, attempting to extubate himself, able to follow command  Patient was then successfully extubated, weaned to 8L MF NC  Patient remained hypertensive and tachycardic  ECG on arrival showed narrow complex sinus tachycardia with frequent multifocal PVCs  Patient was subsequently initiated on amiodarone infusion  Further work-up in the ED revealed uremarkable CBC and CMP with mild leukocytosis 11 8 and elevated blood glucose 202; elevated lactate of 4; VBG 7 05/91 5/39 4 which improved to 7 25/64 8/61 8 with 8L [address]; elevated at Hs-tropes 19/162/241 at 0H/2H/4H; D-dimer 1 14; rapid drug screen positive for THC and cocaine; CT head negative, CTA PE study negative for PE, however showed bilateral anterior rib fractures in ribs 2-6 with trace anterior right pneumothorax adjacent to a rib fx, long with RUL pneumonia likely 2/2 aspiration  On my evaluation, patient no recollection of events transpired prior to cardiac arrest  Per chart review, patient reportedly had an argument with his wife, also noted several her Rx Percocets missing  Per step-daughter, patient reportedly consumed cocaine ~30 minutes before being found unresponsive "in preparation for yard work around the house " Unclear prior substance abuse history  Patient endorses substernal chest pain, chest wall pain  Denies precipitating symptoms including shortness of breath, palpitations, numbness or tingling in extremities  Denies orthopnea, PND, leg swelling  Denies prior cardiac history or workup includingTEE, stress test   Family history notable for CAD in both parents  Denies smoking history or any recreational/illicit substance use            Review of Systems  ROS as noted above  Historical Information   Past Medical History:   Diagnosis Date   • Depression      History reviewed  No pertinent surgical history    Social History     Substance and Sexual Activity   Alcohol Use Not Currently     Social History     Substance and Sexual Activity   Drug Use Not Currently     Social History     Tobacco Use Smoking Status Former   • Types: Cigarettes   Smokeless Tobacco Never     Family History: non-contributory    Meds/Allergies   Hospital Medications:   Current Facility-Administered Medications   Medication Dose Route Frequency   • amiodarone (CORDARONE) 900 mg in dextrose 5 % 500 mL infusion  1 mg/min Intravenous Continuous   • [START ON 2/1/2023] aspirin chewable tablet 81 mg  81 mg Oral Daily   • atorvastatin (LIPITOR) tablet 40 mg  40 mg Oral Daily With Dinner   • carvedilol (COREG) tablet 6 25 mg  6 25 mg Oral BID With Meals   • chlorhexidine (PERIDEX) 0 12 % oral rinse 15 mL  15 mL Mouth/Throat Q12H [address]   • heparin (porcine) subcutaneous injection 5,000 Units  5,000 Units Subcutaneous Q8H [address]     Home Medications: (Not in a hospital admission)      No Known Allergies    Objective   Vitals: Blood pressure 132/73, pulse 58, temperature 97 7 °F (36 5 °C), temperature source Rectal, resp  rate 18, height 6' (1 829 m), weight 80 3 kg (177 lb), SpO2 94 %  Orthostatic Blood Pressures    Flowsheet Row Most Recent Value   Blood Pressure 132/73 filed at 01/31/2023 1600   Patient Position - Orthostatic VS Lying filed at 01/31/2023 0915            Invasive Devices     Peripheral Intravenous Line  Duration           Peripheral IV 01/31/23 Dorsal (posterior); Right Forearm <1 day    Peripheral IV 01/31/23 Left Antecubital <1 day                Physical Exam:  GEN: Pavel Stephenson  appears well, alert and oriented x 3, pleasant and cooperative    Somnolent but arousable  HEENT:  Normocephalic, atraumatic, anicteric, moist mucous membranes  NECK: No JVD or carotid bruits   HEART: Regular rhythm, regular rate, normal S1 and S2, no murmurs, clicks, gallops or rubs   LUNGS: Clear to auscultation bilaterally; no wheezes, rales, or rhonchi; respiration nonlabored; on 8L MF NC  ABDOMEN:  Normoactive bowel sounds, soft, no tenderness, no distention  EXTREMITIES: peripheral pulses palpable; no edema  NEURO: no gross focal findings; cranial nerves grossly intact   SKIN:  Dry, intact, warm to touch    Lab Results:   I have personally reviewed pertinent lab results  CBC with diff:   Results from last 7 days   Lab Units 01/31/23  0858   WBC Thousand/uL 11 81*   RBC Million/uL 4 92   HEMOGLOBIN g/dL 15 3   HEMATOCRIT % 48 8   MCV fL 99*   MCH pg 31 1   MCHC g/dL 31 4   RDW % 13 6   MPV fL 11 1   PLATELETS Thousands/uL 189     CMP:   Results from last 7 days   Lab Units 01/31/23  0858   SODIUM mmol/L 138   CHLORIDE mmol/L 105   CO2 mmol/L 27   BUN mg/dL 13   CREATININE mg/dL 1 04   CALCIUM mg/dL 8 8   AST U/L 39   ALT U/L 37   ALK PHOS U/L 103   EGFR ml/min/1 73sq m 71     HS Troponin:   0   Lab Value Date/Time    HSTNI0 284 (H) 01/31/2023 1507    HSTNI2 162 (H) 01/31/2023 1116    HSTNI4 251 (H) 01/31/2023 1330     TSH:     Magnesium:     Lipid Profile:         Results from last 7 days   Lab Units 01/31/23  0858   POTASSIUM mmol/L 4 3   CO2 mmol/L 27   CHLORIDE mmol/L 105   BUN mg/dL 13   CREATININE mg/dL 1 04     Results from last 7 days   Lab Units 01/31/23  0858   HEMOGLOBIN g/dL 15 3   HEMATOCRIT % 48 8   PLATELETS Thousands/uL 189                 Imaging: I have personally reviewed pertinent reports  and I have personally reviewed pertinent films in PACS  CT head without contrast   Final Result by Kareem Gregorio MD (01/31 1108)      No evidence of acute intracranial process  Chronic microangiopathy  Workstation performed: [id number]         CTA ED chest PE Study   Final Result by Leland Villar MD (01/31 1121)      1  No pulmonary embolism  2   Bilateral anterior 2nd- 6th rib fractures  Trace anterior right pneumothorax subjacent to a rib fracture  3   Right upper lobe pneumonia, likely due to aspiration  4   Interlobular septal thickening likely represents pulmonary edema  Groundglass opacity in the medial right upper lobe may be related to edema, infection or atelectasis     5   Significant bilateral posterior lower lobe segmental atelectasis  6   Metallic material in the lower esophagus  Finding may represent a foreign body  Correlation with prior procedural history is also advised  I personally discussed this study with Lashay Mcgraw on 1/31/2023 at 11:20 AM                               Workstation performed: [id number]         XR chest portable ICU    (Results Pending)   XR abdomen 1 view kub    (Results Pending)       Holter/Telemetry: NSR, rate 60    ECHO: Official read pending      CATH/STRESS TEST: No prior; Tentative plan for [facility] 2/1/23;    EKG: See above    VTE Prophylaxis: Heparin, SCD            Pamela Glover, DO PGY-1  [facility]       ==========================================================================================    Counseling / Coordination of Care  Total floor / unit time spent today 30 minutes minutes  Greater than 50% of total time was spent with the patient and / or family counseling and / or coordination of care  A description of the counseling / coordination of care    ** Please Note: Fluency DirectDictation voice to text software may have been used in the creation of this document   **

## 2023-01-31 NOTE — ED NOTES
Pt placed on nonrebreather per Dr Allie Perez to maintain pt pulse ox at 100%     Tata Rodriguez  01/31/23 9166

## 2023-01-31 NOTE — ED ATTENDING ATTESTATION
1/31/2023  John Ng DO, saw and evaluated the patient  I have discussed the patient with the resident/non-physician practitioner and agree with the resident's/non-physician practitioner's findings, Plan of Care, and MDM as documented in the resident's/non-physician practitioner's note, except where noted  All available labs and Radiology studies were reviewed  I was present for key portions of any procedure(s) performed by the resident/non-physician practitioner and I was immediately available to provide assistance  At this point I agree with the current assessment done in the Emergency Department  I have conducted an independent evaluation of this patient a history and physical is as follows:    Patient presents via EMS after being found unresponsive in a shed behind his house  Patient had had an argument with his wife 20 minutes earlier  Patient has a behavioral health history, recently lost his best friend, has had an opiate abuse history and reportedly has been impulsive previously  Per EMS, when police arrived, they could not palpate a pulse and detected no respirations, thus starting CPR  When EMS arrived, they were able to detect a pulse and a sinus rhythm on their monitor  The patient was in continued respiratory arrest for which they assisted with BVM ventilations  They gave 4 mg Narcan without immediate response  On route, they placed a Johnathon airway device with improved end-tidal CO2 and O2 sats  By the time they arrived, the patient was spontaneously moving all 4 extremities, overbreathing the BVM ventilations and attempting to extubate himself  He was noted to be hypertensive and tachycardic throughout transport  Five minutes after CPR, he was noted to be a wide-complex ventricular tachycardia evidently with a pulse for which no antidysrhythmia was given  In the ED, he was in a narrow complex sinus tachycardia with frequent multifocal PVCs  Amiodarone infusion was ordered    He was able to answer questions appropriately and follow commands correctly  Therefore, he was extubated by respiratory therapy and was successfully able to breathe on his own, conversing freely with us  He reported no recollection of what happened or why he is in the hospital   However, his wife stated that she counted her Percocets and noted that up to 7 were missing  ROS: Denies f/c, HA, LH/dizziness, diaphoresis, CP (surprisingly, despite having had CPR), SOB, abdominal pain, n/v/d  Patient only complains of being cold and was found to have a 97 7 °F rectal temperature  12 system ROS o/w negative  PE: Mild distress, appears uncomfortable, alert, shivering; PERRL, 3 to 4 mm bilaterally, EOMI; MMM, no posterior oropharyngeal exudate, edema or erythema; HRR, tachycardic, no murmur, monitor shows sinus tachycardia with multifocal PVCs at 108 bpm; lungs CTA w/o w/r/r, tachypnea, POx 99% on RA (nl); abdomen s/nt/nd, nl BS in all 4 quadrants; (-) LE edema or calf TTP, FROM extremities x4; skin p/cool/d; CNs GI/NF, oriented to person, time and place but not recent events  MDM: Altered mental status/status post cardiac and respiratory arrest - ACS/MI, cardiac dysrhythmia, opioid overdose (most likely intentional), PE, sepsis, no clinical evidence of pneumonia, pneumothorax  A/P: Will check CT head and chest, cardiac w/u, treat symptoms including starting antidysrhythmic for cardiac stabilization, admit  ED Course  ED Course as of 01/31/23 1503   Tue Jan 31, 2023   1129 CTA ED chest PE Study  Images viewed by me and discussed with radiologist, Dr Binta John    Rib fractures and tiny associated pneumothorax are likely a result of appropriate CPR after cardiac arrest   Consolidation and edema are likely a result of aspiration pneumonitis due to drug use and respiratory arrest   Radiopaque foreign body appears similar in shape and size capsules containing a powdered substance that were found in his pockets and suspected to be containing a controlled substance  Critical Care Time  CriticalCare Time  Performed by: Naveen Higgins DO  Authorized by:  Naveen Higgins DO     Critical care provider statement:     Critical care time (minutes):  40    Critical care time was exclusive of:  Separately billable procedures and treating other patients and teaching time    Critical care was necessary to treat or prevent imminent or life-threatening deterioration of the following conditions:  CNS failure or compromise, cardiac failure, circulatory failure and toxidrome    Critical care was time spent personally by me on the following activities:  Obtaining history from patient or surrogate, development of treatment plan with patient or surrogate, discussions with consultants, evaluation of patient's response to treatment, examination of patient, ordering and performing treatments and interventions, ordering and review of laboratory studies, ordering and review of radiographic studies, re-evaluation of patient's condition and review of old charts    I assumed direction of critical care for this patient from another provider in my specialty: no

## 2023-01-31 NOTE — ED NOTES
Pt extubated by providers  Pt maintaining airway   Nonrebreather applied at this time     Abdullahi Freeze  01/31/23 1207

## 2023-02-01 ENCOUNTER — APPOINTMENT (INPATIENT)
Dept: RADIOLOGY | Facility: HOSPITAL | Age: 73
End: 2023-02-01

## 2023-02-01 PROBLEM — G93.41 METABOLIC ENCEPHALOPATHY: Status: RESOLVED | Noted: 2023-01-31 | Resolved: 2023-02-01

## 2023-02-01 PROBLEM — J18.9 PNEUMONIA: Status: ACTIVE | Noted: 2023-02-01

## 2023-02-01 PROBLEM — D72.829 LEUKOCYTOSIS: Status: ACTIVE | Noted: 2023-02-01

## 2023-02-01 PROBLEM — F39 MOOD DISORDER (HCC): Status: ACTIVE | Noted: 2023-02-01

## 2023-02-01 PROBLEM — D69.6 THROMBOCYTOPENIA (HCC): Status: ACTIVE | Noted: 2023-02-01

## 2023-02-01 PROBLEM — R04.0 EPISTAXIS: Status: ACTIVE | Noted: 2023-02-01

## 2023-02-01 PROBLEM — J93.9 PNEUMOTHORAX: Status: ACTIVE | Noted: 2023-02-01

## 2023-02-01 PROBLEM — L40.9 PSORIASIS: Status: ACTIVE | Noted: 2023-02-01

## 2023-02-01 PROBLEM — I25.10 CORONARY ARTERY DISEASE: Status: ACTIVE | Noted: 2023-02-01

## 2023-02-01 LAB
2HR DELTA HS TROPONIN: -54 NG/L
ALBUMIN SERPL BCP-MCNC: 3.1 G/DL (ref 3.5–5)
ALP SERPL-CCNC: 66 U/L (ref 46–116)
ALT SERPL W P-5'-P-CCNC: 41 U/L (ref 12–78)
AMPHETAMINES SERPL QL SCN: NEGATIVE
ANION GAP SERPL CALCULATED.3IONS-SCNC: 4 MMOL/L (ref 4–13)
APTT PPP: 43 SECONDS (ref 23–37)
APTT PPP: 66 SECONDS (ref 23–37)
AST SERPL W P-5'-P-CCNC: 46 U/L (ref 5–45)
BARBITURATES UR QL: NEGATIVE
BASOPHILS # BLD AUTO: 0.03 THOUSANDS/ÂΜL (ref 0–0.1)
BASOPHILS NFR BLD AUTO: 0 % (ref 0–1)
BENZODIAZ UR QL: NEGATIVE
BILIRUB SERPL-MCNC: 0.36 MG/DL (ref 0.2–1)
BUN SERPL-MCNC: 17 MG/DL (ref 5–25)
CALCIUM ALBUM COR SERPL-MCNC: 9.1 MG/DL (ref 8.3–10.1)
CALCIUM SERPL-MCNC: 8.4 MG/DL (ref 8.3–10.1)
CARDIAC TROPONIN I PNL SERPL HS: 300 NG/L
CARDIAC TROPONIN I PNL SERPL HS: 354 NG/L
CHLORIDE SERPL-SCNC: 103 MMOL/L (ref 96–108)
CHOLEST SERPL-MCNC: 197 MG/DL
CO2 SERPL-SCNC: 29 MMOL/L (ref 21–32)
COCAINE UR QL: POSITIVE
CREAT SERPL-MCNC: 0.68 MG/DL (ref 0.6–1.3)
EOSINOPHIL # BLD AUTO: 0.05 THOUSAND/ÂΜL (ref 0–0.61)
EOSINOPHIL NFR BLD AUTO: 0 % (ref 0–6)
ERYTHROCYTE [DISTWIDTH] IN BLOOD BY AUTOMATED COUNT: 13.9 % (ref 11.6–15.1)
EST. AVERAGE GLUCOSE BLD GHB EST-MCNC: 108 MG/DL
GFR SERPL CREATININE-BSD FRML MDRD: 96 ML/MIN/1.73SQ M
GLUCOSE SERPL-MCNC: 100 MG/DL (ref 65–140)
GLUCOSE SERPL-MCNC: 113 MG/DL (ref 65–140)
GLUCOSE SERPL-MCNC: 119 MG/DL (ref 65–140)
GLUCOSE SERPL-MCNC: 96 MG/DL (ref 65–140)
HBA1C MFR BLD: 5.4 %
HCT VFR BLD AUTO: 36.4 % (ref 36.5–49.3)
HDLC SERPL-MCNC: 96 MG/DL
HGB BLD-MCNC: 11.8 G/DL (ref 12–17)
IMM GRANULOCYTES # BLD AUTO: 0.04 THOUSAND/UL (ref 0–0.2)
IMM GRANULOCYTES NFR BLD AUTO: 0 % (ref 0–2)
KCT BLD-ACNC: 279 SEC (ref 89–137)
KCT BLD-ACNC: 299 SEC (ref 89–137)
LDLC SERPL CALC-MCNC: 87 MG/DL (ref 0–100)
LDLC SERPL DIRECT ASSAY-MCNC: 90 MG/DL (ref 0–100)
LYMPHOCYTES # BLD AUTO: 1.02 THOUSANDS/ÂΜL (ref 0.6–4.47)
LYMPHOCYTES NFR BLD AUTO: 9 % (ref 14–44)
MAGNESIUM SERPL-MCNC: 2.3 MG/DL (ref 1.6–2.6)
MCH RBC QN AUTO: 30.8 PG (ref 26.8–34.3)
MCHC RBC AUTO-ENTMCNC: 32.4 G/DL (ref 31.4–37.4)
MCV RBC AUTO: 95 FL (ref 82–98)
METHADONE UR QL: NEGATIVE
MONOCYTES # BLD AUTO: 0.86 THOUSAND/ÂΜL (ref 0.17–1.22)
MONOCYTES NFR BLD AUTO: 7 % (ref 4–12)
NEUTROPHILS # BLD AUTO: 9.87 THOUSANDS/ÂΜL (ref 1.85–7.62)
NEUTS SEG NFR BLD AUTO: 84 % (ref 43–75)
NRBC BLD AUTO-RTO: 0 /100 WBCS
OPIATES UR QL SCN: POSITIVE
OXYCODONE+OXYMORPHONE UR QL SCN: NEGATIVE
PCP UR QL: NEGATIVE
PHOSPHATE SERPL-MCNC: 3.1 MG/DL (ref 2.3–4.1)
PLATELET # BLD AUTO: 130 THOUSANDS/UL (ref 149–390)
PMV BLD AUTO: 11.1 FL (ref 8.9–12.7)
POTASSIUM SERPL-SCNC: 3.8 MMOL/L (ref 3.5–5.3)
PROCALCITONIN SERPL-MCNC: 0.33 NG/ML
PROT SERPL-MCNC: 6.1 G/DL (ref 6.4–8.4)
RBC # BLD AUTO: 3.83 MILLION/UL (ref 3.88–5.62)
SODIUM SERPL-SCNC: 136 MMOL/L (ref 135–147)
SPECIMEN SOURCE: ABNORMAL
SPECIMEN SOURCE: ABNORMAL
THC UR QL: POSITIVE
TRIGL SERPL-MCNC: 70 MG/DL
WBC # BLD AUTO: 11.87 THOUSAND/UL (ref 4.31–10.16)

## 2023-02-01 PROCEDURE — 027034Z DILATION OF CORONARY ARTERY, ONE ARTERY WITH DRUG-ELUTING INTRALUMINAL DEVICE, PERCUTANEOUS APPROACH: ICD-10-PCS | Performed by: INTERNAL MEDICINE

## 2023-02-01 PROCEDURE — B2111ZZ FLUOROSCOPY OF MULTIPLE CORONARY ARTERIES USING LOW OSMOLAR CONTRAST: ICD-10-PCS | Performed by: INTERNAL MEDICINE

## 2023-02-01 DEVICE — XIENCE SKYPOINT™ EVEROLIMUS ELUTING CORONARY STENT SYSTEM 3.00 MM X 23 MM / RAPID-EXCHANGE
Type: IMPLANTABLE DEVICE | Site: CORONARY | Status: FUNCTIONAL
Brand: XIENCE SKYPOINT™

## 2023-02-01 RX ORDER — HEPARIN SODIUM 1000 [USP'U]/ML
INJECTION, SOLUTION INTRAVENOUS; SUBCUTANEOUS CODE/TRAUMA/SEDATION MEDICATION
Status: DISCONTINUED | OUTPATIENT
Start: 2023-02-01 | End: 2023-02-01 | Stop reason: HOSPADM

## 2023-02-01 RX ORDER — ARIPIPRAZOLE 10 MG/1
10 TABLET ORAL
COMMUNITY
Start: 2022-11-23

## 2023-02-01 RX ORDER — ATORVASTATIN CALCIUM 80 MG/1
80 TABLET, FILM COATED ORAL
Status: DISCONTINUED | OUTPATIENT
Start: 2023-02-01 | End: 2023-02-04 | Stop reason: HOSPADM

## 2023-02-01 RX ORDER — SERTRALINE HYDROCHLORIDE 100 MG/1
100 TABLET, FILM COATED ORAL
COMMUNITY
Start: 2022-11-23

## 2023-02-01 RX ORDER — NITROGLYCERIN 20 MG/100ML
INJECTION INTRAVENOUS CODE/TRAUMA/SEDATION MEDICATION
Status: DISCONTINUED | OUTPATIENT
Start: 2023-02-01 | End: 2023-02-01 | Stop reason: HOSPADM

## 2023-02-01 RX ORDER — INSULIN LISPRO 100 [IU]/ML
1-6 INJECTION, SOLUTION INTRAVENOUS; SUBCUTANEOUS
Status: DISCONTINUED | OUTPATIENT
Start: 2023-02-01 | End: 2023-02-03

## 2023-02-01 RX ORDER — MIDAZOLAM HYDROCHLORIDE 2 MG/2ML
INJECTION, SOLUTION INTRAMUSCULAR; INTRAVENOUS CODE/TRAUMA/SEDATION MEDICATION
Status: DISCONTINUED | OUTPATIENT
Start: 2023-02-01 | End: 2023-02-01 | Stop reason: HOSPADM

## 2023-02-01 RX ORDER — HEPARIN SODIUM 5000 [USP'U]/ML
5000 INJECTION, SOLUTION INTRAVENOUS; SUBCUTANEOUS EVERY 8 HOURS SCHEDULED
Status: DISCONTINUED | OUTPATIENT
Start: 2023-02-01 | End: 2023-02-04 | Stop reason: HOSPADM

## 2023-02-01 RX ORDER — ASPIRIN 81 MG/1
324 TABLET, CHEWABLE ORAL ONCE
Status: COMPLETED | OUTPATIENT
Start: 2023-02-01 | End: 2023-02-01

## 2023-02-01 RX ORDER — LIDOCAINE HYDROCHLORIDE 10 MG/ML
INJECTION, SOLUTION EPIDURAL; INFILTRATION; INTRACAUDAL; PERINEURAL CODE/TRAUMA/SEDATION MEDICATION
Status: DISCONTINUED | OUTPATIENT
Start: 2023-02-01 | End: 2023-02-01 | Stop reason: HOSPADM

## 2023-02-01 RX ORDER — FENTANYL CITRATE 50 UG/ML
INJECTION, SOLUTION INTRAMUSCULAR; INTRAVENOUS CODE/TRAUMA/SEDATION MEDICATION
Status: DISCONTINUED | OUTPATIENT
Start: 2023-02-01 | End: 2023-02-01 | Stop reason: HOSPADM

## 2023-02-01 RX ORDER — HYDROMORPHONE HCL/PF 1 MG/ML
0.5 SYRINGE (ML) INJECTION EVERY 4 HOURS PRN
Status: DISCONTINUED | OUTPATIENT
Start: 2023-02-01 | End: 2023-02-03

## 2023-02-01 RX ORDER — VERAPAMIL HCL 2.5 MG/ML
AMPUL (ML) INTRAVENOUS CODE/TRAUMA/SEDATION MEDICATION
Status: DISCONTINUED | OUTPATIENT
Start: 2023-02-01 | End: 2023-02-01 | Stop reason: HOSPADM

## 2023-02-01 RX ORDER — SODIUM CHLORIDE 9 MG/ML
50 INJECTION, SOLUTION INTRAVENOUS CONTINUOUS
Status: DISPENSED | OUTPATIENT
Start: 2023-02-01 | End: 2023-02-01

## 2023-02-01 RX ORDER — BUPROPION HYDROCHLORIDE 150 MG/1
150 TABLET ORAL
COMMUNITY
End: 2023-02-04

## 2023-02-01 RX ORDER — SODIUM CHLORIDE 9 MG/ML
125 INJECTION, SOLUTION INTRAVENOUS CONTINUOUS
Status: DISPENSED | OUTPATIENT
Start: 2023-02-01 | End: 2023-02-01

## 2023-02-01 RX ORDER — BUPROPION HYDROCHLORIDE 300 MG/1
300 TABLET ORAL
COMMUNITY
Start: 2022-11-23

## 2023-02-01 RX ORDER — NITROGLYCERIN 0.4 MG/1
0.4 TABLET SUBLINGUAL
Status: DISCONTINUED | OUTPATIENT
Start: 2023-02-01 | End: 2023-02-04 | Stop reason: HOSPADM

## 2023-02-01 RX ADMIN — CEFTRIAXONE 1000 MG: 1 INJECTION, POWDER, FOR SOLUTION INTRAMUSCULAR; INTRAVENOUS at 17:57

## 2023-02-01 RX ADMIN — CHLORHEXIDINE GLUCONATE 0.12% ORAL RINSE 15 ML: 1.2 LIQUID ORAL at 08:48

## 2023-02-01 RX ADMIN — CHLORHEXIDINE GLUCONATE 0.12% ORAL RINSE 15 ML: 1.2 LIQUID ORAL at 21:01

## 2023-02-01 RX ADMIN — CARVEDILOL 6.25 MG: 6.25 TABLET, FILM COATED ORAL at 08:48

## 2023-02-01 RX ADMIN — HEPARIN SODIUM 5000 UNITS: 5000 INJECTION INTRAVENOUS; SUBCUTANEOUS at 22:52

## 2023-02-01 RX ADMIN — HYDROMORPHONE HYDROCHLORIDE 0.5 MG: 1 INJECTION, SOLUTION INTRAMUSCULAR; INTRAVENOUS; SUBCUTANEOUS at 08:48

## 2023-02-01 RX ADMIN — AMIODARONE HYDROCHLORIDE 1 MG/MIN: 50 INJECTION, SOLUTION INTRAVENOUS at 04:47

## 2023-02-01 RX ADMIN — HYDROMORPHONE HYDROCHLORIDE 0.5 MG: 1 INJECTION, SOLUTION INTRAMUSCULAR; INTRAVENOUS; SUBCUTANEOUS at 03:01

## 2023-02-01 RX ADMIN — HEPARIN SODIUM 2000 UNITS: 1000 INJECTION INTRAVENOUS; SUBCUTANEOUS at 01:58

## 2023-02-01 RX ADMIN — ATORVASTATIN CALCIUM 80 MG: 80 TABLET, FILM COATED ORAL at 18:09

## 2023-02-01 RX ADMIN — DICLOFENAC SODIUM 2 G: 10 GEL TOPICAL at 21:01

## 2023-02-01 RX ADMIN — TRIMETHOBENZAMIDE HYDROCHLORIDE 200 MG: 100 INJECTION INTRAMUSCULAR at 18:08

## 2023-02-01 RX ADMIN — SODIUM CHLORIDE 50 ML/HR: 0.9 INJECTION, SOLUTION INTRAVENOUS at 18:29

## 2023-02-01 RX ADMIN — CARVEDILOL 6.25 MG: 6.25 TABLET, FILM COATED ORAL at 18:09

## 2023-02-01 RX ADMIN — ASPIRIN 81 MG CHEWABLE TABLET 81 MG: 81 TABLET CHEWABLE at 08:48

## 2023-02-01 RX ADMIN — TICAGRELOR 90 MG: 90 TABLET ORAL at 22:52

## 2023-02-01 RX ADMIN — HYDROMORPHONE HYDROCHLORIDE 0.5 MG: 1 INJECTION, SOLUTION INTRAMUSCULAR; INTRAVENOUS; SUBCUTANEOUS at 17:57

## 2023-02-01 RX ADMIN — ASPIRIN 324 MG: 81 TABLET, CHEWABLE ORAL at 18:29

## 2023-02-01 NOTE — PROGRESS NOTES
INTERNAL MEDICINE RESIDENCY TRANSFER ACCEPTANCE NOTE     Name: Cathy Flowers  Age & Sex: 71 y o  male   MRN: [id number]  [facility]   Encounter: [id number]  Hospital Stay Days: 1    Accepting team: SOD Team C   Code Status: Level 1 - Full Code  Disposition: Patient requires Med/Surg with Telemetry    ASSESSMENT/PLAN     Principal Problem:    Cardiac arrest [facility])  Active Problems:    Wide-complex tachycardia    [facility]    Acute hypoxemic respiratory failure ([email or web address] )    Multiple fractures of ribs, bilateral, initial encounter for closed fracture    Foreign body in stomach    Elevated troponin    Substance use disorder    Mood disorder ([email or web address] )    Pneumothorax    Thrombocytopenia (HCC)    Leukocytosis    Epistaxis    Pneumonia    Psoriasis    Coronary artery disease      * Cardiac arrest [facility])  Assessment & Plan  Presented after cardiac arrest at home, ROSC in the field without defibrillation or medications  1/3 rhythm strips from EMS showed wide complex tachycardia, other sinus rhythm  ? Unclear what rhythm patient was in during arrest    Assessment:  ? ECG on admission showing inferior Q waves and new T wave inversions in the anteroseptal leads on repeat  ? TTE on admission shows LVEF 25%, severely reduced systolic function, F9QE, moderate global hypokinesis  ? Etiology may be primary arrhythmia vs ACS vs hypoxia related to pneumonia vs substance use  Plan:  ? Cardiology following, appreciate recommendations  ? Underwent cath 02/01 that demonstrated 99% Prox RCA, 60% Mid RCA, and 60% Mid Cx  S/P angioplasty and DAVID x1   ? ASA 81 daily, statin, Brilinta  ? Start Coreg 6 25 mg bid - consider decreasing to 3 125 mg bid if blood pressures continue to be soft, appreciate cardiology recommendations  ? Continue amiodarone infusion for now    Wide-complex tachycardia  Assessment & Plan  Present during transport (see media for rhythm strips from EMS)  ? Telemetry  ?  Continue amiodarone for now  ? Cardiology consulted, appreciate recommendations  ? EP consulted, appreciate recommendations    CHF (congestive heart failure) ([facility] )  Assessment & Plan  Wt Readings from Last 3 Encounters:   02/01/23 82 kg (180 lb 12 4 oz)     Patient with new reduced EF 25% on TTE on admission in setting of recent cardiac arrest and signs of ischemia on ECG  · No history per patient and chart review  · Cardiology following, appreciate recommendations  · S/P Catheterization -  · GDMT as able  - Starting Coreg 6 25 mg bid - consider reduced dose in setting of soft blood pressures and bradycardia  - Held  ACEi/ARB/ARNI for cath  - Per cardiology, plan to initiate Entresto 24/26 mg twice daily starting tomorrow  - No evidence of acute exacerbation, avoid diuresis at this time  - No need for aldosterone antagonism at this time  - Consider SGLT2i after cath  - Glucose has been wnl, A1c 5 4    Acute hypoxemic respiratory failure [facility])  Assessment & Plan  Patient intubated in the field during cardiac arrest resuscitation, extubated upon arrival to ED  Now saturating 100% 12 L MFNC  Improved mentation and oxygen saturations  · Wean O2 as tolerated  · Incentive spirometry    Multiple fractures of ribs, bilateral, initial encounter for closed fracture  Assessment & Plan  Present on CTA PE on admission, anterior rib fractures 2-6 bilaterally 2/2 CPR during cardiac arrest  · Maintain adequate pain control to promote proper respiration, currently prn Dilaudid  · Incentive spirometry as tolerated  ·  topical voltaren    Foreign body in stomach  Assessment & Plan  CTA PE on admission shows metallic material in the lower esophagus; likely a tooth in setting of patient report of loss of a tooth  CXR and KUB show object in stomach 02/01  · Lower concern for ingestion of ilicit substance that may have precipitated cardiac arrest  · Will hold off on GI consult for now   If still in stomach after cath will need GI consult; follow up KUB  · cardiac diet    Coronary artery disease  Assessment & Plan  S/P cardiac cath performed on 2/1/2023: 99% Prox RCA, 60% Mid RCA, and 60% Mid Cx  S/P angioplasty and DAVID x1    · ASA 81 mg and Brilinta 90 mg, Statin  · Cardiology consulted and appreciate recommendations      Psoriasis  Assessment & Plan  On Humira (Adalimumab) outpatient  · Hold Humira inpatient  · Betamethasone or hydrocortisone cream for psoriasis as needed  Pneumonia  Assessment & Plan  Present on CTA PE on admission, likely 2/2 to aspiration during cardiac arrest  · Follow up blood cultures from admission  · Rocephin 1 g q24hr, 5 day course - Day 2/5  · Can broaden antibiotics if clinically indicated  · Trend fever/WBC    Recent Labs     01/31/23  1758 02/01/23  0546   PROCALCITONI 0 22 0 33*       Epistaxis  Assessment & Plan  Reported on 02/01 am, left sided  Intermittent, responded to packing and pressure  · In setting of admitted history of cocaine use and history of nosebleeds  · If continue packing and pressure, saline flush  Consider Afrin  · Discontinued heparin following cath  Leukocytosis  Assessment & Plan  In setting of cardiac arrest, aspiration and pneumonia  · Continue to monitor    Recent Labs     01/31/23  0858 01/31/23  1707 02/01/23  0546   WBC 11 81* 12 12* 11 87*       Thrombocytopenia ([facility] )  Assessment & Plan  189 on admission, 130 today  · Intermittent epistaxis but otherwise no bleeding symptoms  · Continue to monitor CBC    Recent Labs     01/31/23  0858 01/31/23  1707 02/01/23  0546    147* 130*       Pneumothorax  Assessment & Plan  Improved - not appreciable on CXR today  S/p cardiac arrest with CPR with broken ribs   CTA PE on admission shows trace anteriro right pneumothorax subjacent to a rib facture  · Patient in no acute distress and hemodynamically stable on 2L NC  · Wean supplemental oxygen as tolerated  · Stat CXR if patient suddenly requires increased O2 or become hemodynamically unstable  Mood disorder (HCC)  Assessment & Plan  Home medications Abilify 10 mg, Zoloft 100 mg, Wellbutrin 300 mg  Restart following cath    Substance use disorder  Assessment & Plan  Presented to the ED post cardiac arrest, UDS positive for cocaine and THC  Patient admits to cocaine use, reported for back pain, last use 2-3 days ago  ·  on discontinuation of substance use in setting of cardiac arrest, possible ACS and CHF  · Discuss alternative pain control options for back pain    Elevated troponin  Assessment & Plan  S/p cardiac arrest - concern for NSTEMI  · ECG 01/31 showed ST depressions in lateral limb leads and anteroseptal leads in setting of chest pain  · Elevated trops to 350s  · Catheterization today, 02/01  · Cardiology following, appreciate recommendations    Metabolic encephalopathy-resolved as of 2/1/2023  Assessment & Plan  Improved  Patient was somnolent but arousable on admission, frequently falling asleep during conversations  · S/p cardiac arrest with amnesia to the event  · Did no respond to Narcan in the field  · Acidotic on arrival with improving VBG one supplemental oxygen  · UDS positive for THC and cocaine on admission  · Monitor mental status      VTE Pharmacologic Prophylaxis: Heparin  VTE Mechanical Prophylaxis: sequential compression device    HOSPITAL COURSE     Per Deanne Hurst's critical care progress note from 2/1: "Milly Swanson  Is a 71 y o  male with PMHx of HTN, Bell's palsy, recurrent MDD, and PTSD who [facility] via EMS after cardiac arrest at home  He received CPR and ROSC was obtained without defibrillation  Patient denies memory of the incident, and denies substance use prior to the event, last admitted cocaine use 2-3 days ago  UDS positive for THC and cocaine, and family has since reported he was observed snorting cocaine approximately 30 minutes before he was found down   En route a Johnathon airway was placed and ECG showed a run of wide complex tachycardia (strips available in media), but by the time patient arrived in the ED patient was in NSR, awake and alert and attempting to remove the airway himself  He was started on amiodarone infusion without bolus and was weaned to [address], initially 8L/min now stable at 12 L/min  Vitals also showed elevated heart rates on presentation and hypertension which improved since admission  Labs notable for lactate 4 0 > 1 0 with fluids, mild leukocytosis, mild Hgb depression to 11 8, VBG 7 05/91 5/39 4 on admission improved with resusciatation  Troponins elevated over his admission to peak of 354  CT head was negative for intracranial abnormalities  CTA PE showed no PE, bilateral anterior rib fractures in ribs 2-6 with trace anterior pneumothorax adjacent to a rib fracture, RUL pneumonia vs pneumonitis likely due to aspiration, as well as pulmonary edema with GGO in RUL related to edema vs infection vs atelectasis  CTA also showed metallic material in the lower esophagus suspicious for a tooth due to imaging and patient interview  ECHO showed LVEF of 25% with severe systolic dysfunction, S5SH, and moderate global hypokinesis  ECG evolved to show signs of ischemia      Cardiology consulted, recommended cath and EP consult for ICD for secondary prevention in setting of ECG concerning for ischemia and heart failure  Overnight patient was given Dilaudid for rib pain  Is on 12 L Midflow saturating in the high 90's  Hemodynamically stable  This am had spontaneous epistaxis intermittently  Team was notified by nursing that bleeding had increased and the nose was packed with improvement  Patient underwent cardiac cath on 2/1/2023  Results revealed: 99% Prox RCA, 60% Mid RCA, and 60% Mid Cx  S/P angioplasty and DAVID x1  Patient continued on ASA/Brilinta and heparin gtt discontinued  Following cath, patient remained hemodynamically stable and without any critical care needs   Patient was transferred to the medical surgical floor with telemetry under the service of SOD "    SUBJECTIVE     Patient resting in bed with many family members at bedside  Pt reports some confusion since coming out his cath  He states he has significant right lower chest wall pain that worsens with movement or touch  He does endorse some shortness of breath secondary to this pain  He also feels nauseated since his procedure  He denies any R arm pain,  abdominal pain, mouth pain, throat pain, vomiting, diarrhea, constipation, difficulty urinating, fevers, chills or any other concerns  OBJECTIVE     Vitals:    02/01/23 1742 02/01/23 1757 02/01/23 1820 02/01/23 1859   BP:  133/73 124/71 127/78   BP Location:       Pulse:  (!) 50 (!) 50 (!) 50   Resp:   22    Temp:       TempSrc:       SpO2: 91% 94% 93% 94%   Weight:       Height:         I/O last 24 hours: In: 717 6 [I V :717 6]  Out: 1140 [Urine:1140]    Physical Exam  Vitals and nursing note reviewed  Constitutional:       General: He is not in acute distress  Appearance: Normal appearance  He is well-developed  He is not ill-appearing, toxic-appearing or diaphoretic  HENT:      Head: Normocephalic and atraumatic  Nose: No rhinorrhea  Right Nostril: No epistaxis  Left Nostril: No epistaxis  Mouth/Throat:      Mouth: Mucous membranes are moist       Pharynx: Oropharynx is clear  Eyes:      Extraocular Movements: Extraocular movements intact  Conjunctiva/sclera: Conjunctivae normal    Cardiovascular:      Rate and Rhythm: Normal rate and regular rhythm  Pulses: Normal pulses  Heart sounds: Normal heart sounds  No murmur heard  Pulmonary:      Effort: Pulmonary effort is normal  No respiratory distress  Breath sounds: No wheezing  Comments: On 2L NC  Chest:      Chest wall: Tenderness (right lower chest wall TTP, no ecchymosis) present  Abdominal:      General: Abdomen is flat  Bowel sounds are normal  There is no distension  Palpations: Abdomen is soft  Tenderness: There is no abdominal tenderness  Musculoskeletal:      Cervical back: Neck supple  Right lower leg: No edema  Left lower leg: No edema  Skin:     General: Skin is warm and dry  Capillary Refill: Capillary refill takes less than 2 seconds  Neurological:      General: No focal deficit present  Mental Status: He is alert and oriented to person, place, and time  Cranial Nerves: No cranial nerve deficit  Psychiatric:         Mood and Affect: Mood normal          Behavior: Behavior normal        LABORATORY DATA     Labs: I have personally reviewed pertinent reports  Results from last 7 days   Lab Units 02/01/23  0546 01/31/23  1707 01/31/23  0858   WBC Thousand/uL 11 87* 12 12* 11 81*   HEMOGLOBIN g/dL 11 8* 13 6 15 3   HEMATOCRIT % 36 4* 41 9 48 8   PLATELETS Thousands/uL 130* 147* 189   NEUTROS PCT % 84*  --  46   MONOS PCT % 7  --  7      Results from last 7 days   Lab Units 02/01/23  0546 01/31/23  0858   POTASSIUM mmol/L 3 8 4 3   CHLORIDE mmol/L 103 105   CO2 mmol/L 29 27   BUN mg/dL 17 13   CREATININE mg/dL 0 68 1 04   CALCIUM mg/dL 8 4 8 8   ALK PHOS U/L 66 103   ALT U/L 41 37   AST U/L 46* 39     Results from last 7 days   Lab Units 02/01/23  0546   MAGNESIUM mg/dL 2 3     Results from last 7 days   Lab Units 02/01/23  0546   PHOSPHORUS mg/dL 3 1      Results from last 7 days   Lab Units 02/01/23  0830 01/31/23  2322 01/31/23  1707   INR   --   --  0 98   PTT seconds 66* 43* 24     Results from last 7 days   Lab Units 01/31/23  1159   LACTIC ACID mmol/L 1 0         Micro:  Lab Results   Component Value Date    BLOODCX No Growth at 24 hrs  01/31/2023    BLOODCX No Growth at 24 hrs  01/31/2023     IMAGING & DIAGNOSTIC TESTING     Imaging: I have personally reviewed pertinent reports      XR chest portable    Result Date: 2/1/2023  Impression: Right midlung opacity/consolidation, decreased from the previous study Left lower lung opacity may be due to atelectasis or infiltrate Workstation performed: [id number]     XR abdomen 1 view kub    Result Date: 2/1/2023  Impression: Opaque foreign body in the left upper quadrant of the abdomen is unchanged in position as of the latest study on 2/1/2023 at 4:54 AM  This could represent a tooth fragment and should be correlated with physical exam the oral cavity  Workstation performed: [id number]     XR abdomen 1 view kub    Result Date: 2/1/2023  Impression: Opaque foreign body in the left upper quadrant of the abdomen is unchanged in position as of the latest study on 2/1/2023 at 4:54 AM  This could represent a tooth fragment and should be correlated with physical exam the oral cavity  Workstation performed: [id number]     CT head without contrast    Result Date: 1/31/2023  Impression: No evidence of acute intracranial process  Chronic microangiopathy  Workstation performed: [id number]     XR chest portable ICU    Result Date: 2/1/2023  Impression: Foreign body appears to be in the left upper quadrant of the abdomen  Hazy density probably represents effusion  Left lower lobe atelectasis versus infiltrate  Workstation performed: [id number]     CTA ED chest PE Study    Result Date: 1/31/2023  Impression: 1  No pulmonary embolism  2   Bilateral anterior 2nd- 6th rib fractures  Trace anterior right pneumothorax subjacent to a rib fracture  3   Right upper lobe pneumonia, likely due to aspiration  4   Interlobular septal thickening likely represents pulmonary edema  Groundglass opacity in the medial right upper lobe may be related to edema, infection or atelectasis  5   Significant bilateral posterior lower lobe segmental atelectasis  6   Metallic material in the lower esophagus  Finding may represent a foreign body  Correlation with prior procedural history is also advised    I personally discussed this study with Kody Avila on 1/31/2023 at 11:20 AM  Workstation performed: [id number]     EKG, Pathology, and Other Studies: I have personally reviewed pertinent reports  ALLERGIES   No Known Allergies  MEDICATIONS     Current Facility-Administered Medications   Medication Dose Route Frequency Provider Last Rate   • acetaminophen  650 mg Oral Q6H PRN Lonni Glatter, DO     • amiodarone  1 mg/min Intravenous Continuous Lonni Glatter, DO 1 mg/min (02/01/23 0447)   • aspirin  81 mg Oral Daily Amlori Brandt, DO     • atorvastatin  80 mg Oral Daily With Dinner JONATAN Shahid     • carvedilol  6 25 mg Oral BID With Meals Amlori Avina, DO     • cefTRIAXone  1,000 mg Intravenous Q24H Lonni Glatter, DO 1,000 mg (02/01/23 1757)   • chlorhexidine  15 mL Mouth/Throat Q12H 135 [facility], DO     • Diclofenac Sodium  2 g Topical 4x Daily Alisa Mensah MD     • heparin (porcine)  5,000 Units Subcutaneous Q8H [facility], [facility]     • HYDROmorphone  0 5 mg Intravenous Q4H PRN Arvind Mathis, DO     • insulin lispro  1-6 Units Subcutaneous HS Lonni Glatter, DO     • insulin lispro  1-6 Units Subcutaneous 4 times day Alisa Mensah MD     • nitroglycerin  0 4 mg Sublingual Q5 Min PRN JONATAN Shahid     • [START ON 2/2/2023] sacubitril-valsartan  1 tablet Oral BID Alisa Mensah MD     • sodium chloride  125 mL/hr Intravenous Continuous Flora Brewer MD     • sodium chloride  50 mL/hr Intravenous Continuous JONATAN Shahid 50 mL/hr (02/01/23 1829)   • ticagrelor  90 mg Oral Q12H [facility] JONATAN Arias     • trimethobenzamide  200 mg Intramuscular Q6H PRN Lonni Glatter, DO       amiodarone, 1 mg/min, Last Rate: 1 mg/min (02/01/23 0447)  sodium chloride, 125 mL/hr  sodium chloride, 50 mL/hr, Last Rate: 50 mL/hr (02/01/23 1829)      acetaminophen, 650 mg, Q6H PRN  HYDROmorphone, 0 5 mg, Q4H PRN  nitroglycerin, 0 4 mg, Q5 Min PRN  trimethobenzamide, 200 mg, Q6H PRN        Portions of the record may have been created with voice recognition software    Occasional wrong word or "sound a like" substitutions may have occurred due to the inherent limitations of voice recognition software    Read the chart carefully and recognize, using context, where substitutions have occurred     ==  Dalila Pak MD  [address]  Internal Medicine Residency PGY-1

## 2023-02-01 NOTE — ASSESSMENT & PLAN NOTE
189 on admission  · Improved today        Recent Labs     02/02/23  0528 02/03/23  0508 02/04/23  0500   * 142* 168
CTA PE on admission shows metallic material in the lower esophagus; likely a tooth in setting of patient report of loss of a tooth   CXR and KUB show object in stomach 02/01  · Lower concern for ingestion of ilicit substance that may have precipitated cardiac arrest  · Tooth cap in colon on repeat KUB, anticipate that this will pass
Home medications Abilify 10 mg, Zoloft 100 mg, Wellbutrin 300 mg  Restarted 2/2    Continue home meds
Improved  Patient was somnolent but arousable on admission, frequently falling asleep during conversations    · S/p cardiac arrest with amnesia to the event  · Did no respond to Narcan in the field  · Acidotic on arrival with improving VBG one supplemental oxygen  · UDS positive for THC and cocaine on admission  · Monitor mental status
Improved - not appreciable on follow up CXR  S/p cardiac arrest with CPR with broken ribs   CTA PE on admission shows trace anterior right pneumothorax subjacent to a rib facture  · Patient in no acute distress and hemodynamically stable on RA
In setting of cardiac arrest, aspiration and pneumonia  · Improving   · Continue to monitor    Recent Labs     01/31/23  1707 02/01/23  0546 02/02/23  0528 02/03/23  0508   WBC 12 12* 11 87* 10 21* 7 98
On Humira (Adalimumab) outpatient  · Held Humira inpatient
Patient intubated in the field during cardiac arrest resuscitation, extubated upon arrival to ED  Now saturating 100% 12 L MFNC   Improved mentation and oxygen saturations  · Patient saturating appropriately on room air  · Incentive spirometry
Present during transport (see media for rhythm strips from EMS)  ? Continue carvedilol  ? Cardiology consulted, appreciate recommendations  ? EP consulted, appreciate recommendations  ? Amiodarone infusion transitioned to PO on 2/2, PO subsequently discontinued on 2/3  ?  Patient discharged with LifeVest
Present on CTA PE on admission, anterior rib fractures 2-6 bilaterally 2/2 CPR during cardiac arrest  · Maintain adequate pain control to promote proper respiration:  · Continue Tylenol   · Continue lidocaine patches, Voltaren  · Discharged with short course of oxycodone 5 mg as needed every 4 hours for severe pain  · Incentive spirometry as tolerated
Present on CTA PE on admission, likely 2/2 to aspiration during cardiac arrest  · Follow up blood cultures from admission w/ NGTD  · Pt received 4 doses of CTX    Discontinued 2/3  Recent Labs     01/31/23  1758 02/01/23  0546 02/03/23  0508   PROCALCITONI 0 22 0 33* 0 16
Presented after cardiac arrest at home, ROSC in the field without defibrillation or medications  1/3 rhythm strips from EMS showed wide complex tachycardia, other sinus rhythm  ? Unclear what rhythm patient was in during arrest    Assessment:  ? ECG on admission showing inferior Q waves and new T wave inversions in the anteroseptal leads on repeat  ? TTE on admission shows LVEF 25%, severely reduced systolic function, D1BD, moderate global hypokinesis  ? Etiology may be primary arrhythmia vs ACS vs hypoxia related to pneumonia vs substance use  Plan:  ? Cardiology following, appreciate recommendations  ? Underwent cath 02/01 that demonstrated 99% Prox RCA, 60% Mid RCA, and 60% Mid Cx  S/P angioplasty and DAVID x1   ? Continue aspirin 81 mg daily  ? Continue atorvastatin 80 mg daily  ? Continue carvedilol 6 25 mg 2 times daily  ? Continue Plavix 75 mg daily  ? Continue losartan 25 mg daily  ? Amiodarone discontinued 2/3  ? Follow-up with cardiology as outpatient    Patient discharged with LifeVest   Patient will need a repeat echo as outpatient, if EF remains low he will need a referral back to electrophysiology for ICD placement
Presented to the ED post cardiac arrest, UDS positive for cocaine and THC  Patient admits to cocaine use, reported for back pain, last use 2-3 days ago  ·  on discontinuation of substance use in setting of cardiac arrest, possible ACS and CHF  · Discuss alternative pain control options for back pain  · CRS consult-patient denied on day of discharge  He plans to refrain from future cocaine use 
Reported on 02/01 am, left sided  Intermittent, responded to packing and pressure  · In setting of admitted history of cocaine use and history of nosebleeds  · If continue packing and pressure, saline flush  Consider Afrin  · Discontinued heparin following cath 
S/P cardiac cath performed on 2/1/2023: 99% Prox RCA, 60% Mid RCA, and 60% Mid Cx   S/P angioplasty and DAVID x1    · ASA 81 mg and Plavix 75 mg, high intensity statin  · GDMT per cardiology recommendations as noted under cardiac arrest assessment/plan
S/p cardiac arrest - concern for NSTEMI  · ECG 01/31 showed ST depressions in lateral limb leads and anteroseptal leads in setting of chest pain  · Elevated trops to 350s  · Catheterization as noted above
Wt Readings from Last 3 Encounters:   02/01/23 82 kg (180 lb 12 4 oz)     Patient with new reduced EF 25% on TTE on admission in setting of recent cardiac arrest and signs of ischemia on ECG  · No history per patient and chart review  · Cardiology following, appreciate recommendations  · S/P Catheterization (see report above)  · Pt on optimized medical therapy, DAPT  - Continue Coreg 6 25 mg bid - consider increasing if to 12 5 mg if BP and HR permits  Pt continues to be asymptomatically bradycardic, stable BP   - Entresto was discontinued secondary to low blood pressures    Continue losartan 25 mg daily   - no evidence of acute exacerbation, no need for diuretic at this time  - No indication for aldosterone antagonism at this time  -  LifeVest fitting today, eventual outpatient follow-up testing with echo in 90 days and if persistently low EF, refer for ICD
yes...

## 2023-02-01 NOTE — PROGRESS NOTES
Cardiology Team 2 Progress Note - Yessica Friends  71 y o  male MRN: [id number]    Unit/Bed#: [facility] 209-01 Encounter: [id number]    Assessment/Plan:     50-year-old male with history of depression/anxiety and PTSD on Abilify and Wellbutrin who is admitted for out-of-hospital cardiac arrest with ROSC achieved in the field after 5 to 7 minutes of CPR without the use of medications or defibrillation; rhythm strips en route to ED showed wide-complex tachycardia; UDS positive for cocaine  Initially intubated while en route using Johnathon airway d/t respiratory comprimise, however was extubated on arrival and weaned to 8L [address] after he was able to follow commands and spontaneously move all 4 extremities  Repeat ECG on admission showing inferior Q waves and T wave inversions in lateral and anteroseptal leads; TTE on admission showing LVEF 25%, G1 DD, moderate global hypokinesis       Hospital Stay Days: 1     Assessment:  #Cardiac arrest; out-of-hospital; ROSC achieved by EMS PTA afer 5-7 minutes of CPR, without use of medications or defibrillation   #Wide-complex tachycardia; as seen on rhythm strips obtained by EMS while en route to ED  #NSTEMI; ST depressions in lateral limb leads and anteroseptal leads in setting of chest pain, elevated trops  #Acute toxic metabolic encephalopathy; improving  #Cardiomyopathy; reduced EF 25% on TTE on admission in setting of cardiac arrest PTA; no known prior   #Acute hypoxic hypercapnic respiratory failure   #RUL PNA  #Rib fractures; seen on imaging; likely 2/2 CPR performed in field    #R sided pneumothorax; small; in setting of rib fractures  #Forgein body in stomach  #Cocaine positive, on RDS  #THC positive, on RDS     Plan:   • Etiology likely multifactorial including re-entry ventricular arrhythmia in setting of prior scar tissue, likely anterior wall as seen on TTE, in setting of cocaine/stimulant use vs ACS in setting of active CP, T wave inversions, and elevated HS-trops, vs drug-drug interactions in setting of psychiatric medications and substance use  • Maintain N  P O in preparation for for left heart catheterization today for further evaluation  • Continue amiodarone infusion until catheterization  • Continue GDMT including:  ? Coreg 6 25 mg twice daily; consider increasing to 12 5mg bid after catheterization  ? Avoid metoprolol given UDS positive for cocaine use  ? Hold ACEi/ARB/ARNI until catheterization  ? No indication for diuretics this time  • Continue antiplatelet tx with ASA 81 mg  • Continue lipid-lowering tx with statin   • Strict I/O  • Daily weights       Subjective:   Patient seen and examined  States feels more alert and awake today  Still endorsing chest wall pain and L sided chest pain  No SOB  On 12L [address]  Awaiting C scheduled for later today  Vitals: Blood pressure 128/74, pulse (!) 54, temperature 98 4 °F (36 9 °C), temperature source Oral, resp  rate 22, height 6' (1 829 m), weight 82 kg (180 lb 12 4 oz), SpO2 93 %  , Body mass index is 24 52 kg/m² ,   Orthostatic Blood Pressures    Flowsheet Row Most Recent Value   Blood Pressure 128/74 filed at 02/01/2023 0746   Patient Position - Orthostatic VS Lying filed at 02/01/2023 0746            Intake/Output Summary (Last 24 hours) at 2/1/2023 1213  Last data filed at 2/1/2023 1138  Gross per 24 hour   Intake 767 6 ml   Output 1490 ml   Net -722 4 ml       Physical Exam:    GEN: Alba Jorge   appears well, alert and oriented x 3, pleasant and cooperative   HEENT:  Normocephalic, atraumatic, anicteric, moist mucous membranes  NECK: No JVD or carotid bruits   HEART: Regular rhythm, regular rate, normal S1 and S2, no murmurs, clicks, gallops or rubs   LUNGS: Clear to auscultation bilaterally; no wheezes, rales, or rhonchi; respiration nonlabored   ABDOMEN:  Normoactive bowel sounds, soft, no tenderness, no distention  EXTREMITIES: peripheral pulses palpable; no BLE edema  NEURO: no gross focal findings; cranial nerves grossly intact   SKIN:  Dry, intact, warm to touch      Current Facility-Administered Medications:   •  acetaminophen (TYLENOL) tablet 650 mg, 650 mg, Oral, Q6H PRN, Franniedorothy TaoGlenn, DO  •  amiodarone (CORDARONE) 900 mg in dextrose 5 % 500 mL infusion, 1 mg/min, Intravenous, Continuous, Frannie Hicskck, DO, Last Rate: 33 3 mL/hr at 02/01/23 0447, 1 mg/min at 02/01/23 0447  •  aspirin chewable tablet 81 mg, 81 mg, Oral, Daily, Ammar Alam, DO, 81 mg at 02/01/23 0848  •  atorvastatin (LIPITOR) tablet 40 mg, 40 mg, Oral, Daily With Dinner, [facility], DO, 40 mg at 01/31/23 1704  •  carvedilol (COREG) tablet 6 25 mg, 6 25 mg, Oral, BID With Meals, Ammar Alam, DO, 6 25 mg at 02/01/23 0848  •  ceftriaxone (ROCEPHIN) 1 g/50 mL in dextrose IVPB, 1,000 mg, Intravenous, Q24H, Franniedorothy Elizabeth, DO, Stopped at 01/31/23 1800  •  chlorhexidine (PERIDEX) 0 12 % oral rinse 15 mL, 15 mL, Mouth/Throat, Q12H [address], [facility], DO, 15 mL at 02/01/23 0848  •  heparin (porcine) 25,000 units in 0 45% NaCl 250 mL infusion (premix), 3-20 Units/kg/hr (Order-Specific), Intravenous, Titrated, Franniedorothy Elizabeth, DO, Last Rate: 11 2 mL/hr at 02/01/23 0845, 14 Units/kg/hr at 02/01/23 0845  •  heparin (porcine) injection 2,000 Units, 2,000 Units, Intravenous, Q6H PRN, Franniedorothy Hicksck, DO, 2,000 Units at 02/01/23 0158  •  heparin (porcine) injection 4,000 Units, 4,000 Units, Intravenous, Q6H PRN, Franniedorothy Hicksck, DO  •  HYDROmorphone (DILAUDID) injection 0 5 mg, 0 5 mg, Intravenous, Q4H PRN, Margarita Duncans Depope, DO, 0 5 mg at 02/01/23 0848  •  insulin lispro (HumaLOG) 100 units/mL subcutaneous injection 1-6 Units, 1-6 Units, Subcutaneous, Q6H [address] **AND** Fingerstick Glucose (POCT), , , Q6H, Frannie Elizabeth, DO  •  insulin lispro (HumaLOG) 100 units/mL subcutaneous injection 1-6 Units, 1-6 Units, Subcutaneous, HS, Frannie Elizabeth, DO  •  trimethobenzamide (TIGAN) IM injection 200 mg, 200 mg, Intramuscular, Q6H PRN, Alber Cupertino, DO    Labs & Results:          Results from last 7 days   Lab Units 02/01/23  0546 01/31/23  0858   POTASSIUM mmol/L 3 8 4 3   CO2 mmol/L 29 27   CHLORIDE mmol/L 103 105   BUN mg/dL 17 13   CREATININE mg/dL 0 68 1 04     Results from last 7 days   Lab Units 02/01/23  0546 01/31/23  1707 01/31/23  0858   HEMOGLOBIN g/dL 11 8* 13 6 15 3   HEMATOCRIT % 36 4* 41 9 48 8   PLATELETS Thousands/uL 130* 147* 189     Results from last 7 days   Lab Units 02/01/23  0546   TRIGLYCERIDES mg/dL 70   HDL mg/dL 96   LDL CALC mg/dL 87   HEMOGLOBIN A1C % 5 4       Telemetry:   Personally reviewed by Lexy Merida DO: NSR, ST depressions in lateral/anteroseptal leads    Imaging:   XR abdomen 1 view kub   Final Result by Palomo Corbin MD (02/01 1046)      Opaque foreign body in the left upper quadrant of the abdomen is unchanged in position as of the latest study on 2/1/2023 at 4:54 AM       This could represent a tooth fragment and should be correlated with physical exam the oral cavity  Workstation performed: [id number]         XR chest portable ICU   Final Result by Palomo Corbin MD (02/01 1040)      Foreign body appears to be in the left upper quadrant of the abdomen  Hazy density probably represents effusion  Left lower lobe atelectasis versus infiltrate  Workstation performed: [id number]         XR abdomen 1 view kub   Final Result by Palomo Corbin MD (02/01 1046)      Opaque foreign body in the left upper quadrant of the abdomen is unchanged in position as of the latest study on 2/1/2023 at 4:54 AM       This could represent a tooth fragment and should be correlated with physical exam the oral cavity  Workstation performed: [id number]         CT head without contrast   Final Result by Leigh Mckeon MD (01/31 1108)      No evidence of acute intracranial process  Chronic microangiopathy  Workstation performed: [id number]         CTA ED chest PE Study   Final Result by Francisco Monge MD (01/31 1121)      1  No pulmonary embolism  2   Bilateral anterior 2nd- 6th rib fractures  Trace anterior right pneumothorax subjacent to a rib fracture  3   Right upper lobe pneumonia, likely due to aspiration  4   Interlobular septal thickening likely represents pulmonary edema  Groundglass opacity in the medial right upper lobe may be related to edema, infection or atelectasis  5   Significant bilateral posterior lower lobe segmental atelectasis  6   Metallic material in the lower esophagus  Finding may represent a foreign body  Correlation with prior procedural history is also advised  I personally discussed this study with Lashay Mcgraw on 1/31/2023 at 11:20 AM                               Workstation performed: [id number]         XR chest portable    (Results Pending)           VTE Prophylaxis: Heparin, SCD       Pamela Glover, DO  PGY-1  8 [facility]/ Allscripts/Care Everywhere records reviewed:    ** Please Note: Fluency DirectDictation voice to text software may have been used in the creation of this document   **

## 2023-02-01 NOTE — CONSULTS
Consultation - Electrophysiology Cardiology (EP)   Roscoe Torres  71 y o  male MRN: [id number]  [facility] Encounter: [id number]      Inpatient consult to Electrophysiology  Consult performed by: Gonzalo Hardin MD  Consult ordered by: Liss Moss MD        PCP: No primary care provider on file  Assessment/Plan     Assessment:  Principal Problem:    Cardiac arrest [facility])  Active Problems:    Wide-complex tachycardia    Elevated troponin    Metabolic encephalopathy    Acute exacerbation of CHF (congestive heart failure) (HCC)    Acute hypoxemic respiratory failure (HCC)    Multiple fractures of ribs, bilateral, initial encounter for closed fracture    Foreign body in stomach    Substance use disorder      1  [facility] Cardiac Arrest  Possibly 2/2 Vasospasm from Cocaine use vs  ICM vs  Scar-Mediated Monomorphic VT (presumable prior inferior MI)  --> ROSC s/p 5-7 minutes of CPR  No medications or defibrillation required as per EMS documentation   --> Rhythm strips en route to ED notable for WCT  --> UDS positive for THC and Cocaine; Admitted to snorting 10 minutes prior to arrest  --> ECG:  Sinus Bradycardia  Inferior Q-Waves with LAD likely 2/2 prior Inferior infarction  Anterolateral TWI (V2-V5 and I + aVL)  Inferior leads with QRS fractionation suggesting scar  (see below)  Raises suspicion for scar mediated VT   --> TTE (1/31/23): On my personal read the EF is 25%  Akinesis of inferior and inferolateral wall  G1DD  RV function preserved  2  Substance Abuse    --> THC and Cocaine  3  Presumable Aspiration Pneumonitis  --> GGO in RUL on CXR  --> Was intubated in the field, however extubated within 24 hours of arrival  4   Small Right-Sided PTX 2/2 Rib Fractures from CPR    Plan:  - c/w Amiodarone for arrhythmia suppression  - f/u LHC  - If with interveneable disease, would consider LifeVest + GDMT x3 Months, repeat Echo and monitor for improvement in EF upon which we can consider ICD for secondary prevention    Case discussed and reviewed with Dr Jenny Mcdonald pending attending addendum  Thank you for involving us in the care of your patient  Jessica Evangelista MD  Cardiology Fellow PGY-6    ==========================================================================================    History of Present Illness   Physician Requesting Consult: Zbigniew Castle MD  Reason for Consult / Principal Problem: Cardiac Arrest    HPI: Reina Alfaro  is a 71y o  year old male who presented with a out of hospital cardiac arrest 10-30 minutes after cocaine use  Patient has a history of severe depression on Abilify and Wellbutrin, HTN, PTSD  Patient denies memory of incident  Patient states he does not routinely follow up with doctors and denies ever having seen a cardiologist in the past  To his knowledge he has never had a MI in the past but he does recall an event 6-7 years ago where he suddenly passed out and was down "for a few seconds" before coming to  No history of CP, palpitations, lightheadedness, dizziness, PND, orthopnea otherwise  He states he is fairly functional and able to do ADLs without limitations  States he quite using EtOH about a year ago  "Infrequently uses Cocaine" but unable to numerically quantify    EMS rhythm strip (8:23 AM): Sinus tachycardia  Inferior Q-waves with subtle inferior ST elevations  ?Inferior Aneurysm vs  Vasospasm vs  Inferior MI      EMS rhythm strip (8:28 AM): Probable AIVR, ? reperfusion rhythm originating from the inferior septal region  QRS are fractionated suggesting scar  EMS rhythm strip (8:41 AM): Sinus tachycardia  PVC  ECG in ED:  Date: 2/1/23  Interpretation: Sinus Bradycardia  Inferior Q-Waves with LAD likely 2/2 prior Inferior infarction  Anterolateral TWI (V2-V5 and I + aVL)  Inferior leads with QRS fractionation suggesting scar  No appreciable ST elevations in inferior leads        Review of Systems  ROS as noted above in HPI  Historical Information   Past Medical History:   Diagnosis Date   • Depression      History reviewed  No pertinent surgical history  Social History     Substance and Sexual Activity   Alcohol Use Not Currently     Social History     Substance and Sexual Activity   Drug Use Not Currently     Social History     Tobacco Use   Smoking Status Former   • Types: Cigarettes   Smokeless Tobacco Never     Family History: History reviewed  No pertinent family history      Meds/Allergies   Hospital Medications:   Current Facility-Administered Medications   Medication Dose Route Frequency   • acetaminophen (TYLENOL) tablet 650 mg  650 mg Oral Q6H PRN   • amiodarone (CORDARONE) 900 mg in dextrose 5 % 500 mL infusion  1 mg/min Intravenous Continuous   • aspirin chewable tablet 81 mg  81 mg Oral Daily   • atorvastatin (LIPITOR) tablet 40 mg  40 mg Oral Daily With Dinner   • carvedilol (COREG) tablet 6 25 mg  6 25 mg Oral BID With Meals   • ceftriaxone (ROCEPHIN) 1 g/50 mL in dextrose IVPB  1,000 mg Intravenous Q24H   • chlorhexidine (PERIDEX) 0 12 % oral rinse 15 mL  15 mL Mouth/Throat Q12H [facility]   • heparin (porcine) 25,000 units in 0 45% NaCl 250 mL infusion (premix)  3-20 Units/kg/hr (Order-Specific) Intravenous Titrated   • heparin (porcine) injection 2,000 Units  2,000 Units Intravenous Q6H PRN   • heparin (porcine) injection 4,000 Units  4,000 Units Intravenous Q6H PRN   • HYDROmorphone (DILAUDID) injection 0 5 mg  0 5 mg Intravenous Q4H PRN   • insulin lispro (HumaLOG) 100 units/mL subcutaneous injection 1-6 Units  1-6 Units Subcutaneous Q6H [facility]   • insulin lispro (HumaLOG) 100 units/mL subcutaneous injection 1-6 Units  1-6 Units Subcutaneous HS   • trimethobenzamide (TIGAN) IM injection 200 mg  200 mg Intramuscular Q6H PRN     Home Medications:   Medications Prior to Admission   Medication   • ARIPiprazole (ABILIFY) 10 mg tablet   • buPROPion (WELLBUTRIN XL) 300 mg 24 hr tablet   • sertraline (ZOLOFT) 100 mg tablet   • buPROPion (WELLBUTRIN XL) 150 mg 24 hr tablet       No Known Allergies    Objective   Vitals: Blood pressure 128/74, pulse (!) 54, temperature 98 4 °F (36 9 °C), temperature source Oral, resp  rate 22, height 6' (1 829 m), weight 82 kg (180 lb 12 4 oz), SpO2 93 %  Orthostatic Blood Pressures    Flowsheet Row Most Recent Value   Blood Pressure 128/74 filed at 02/01/2023 0746   Patient Position - Orthostatic VS Lying filed at 02/01/2023 0746            Intake/Output Summary (Last 24 hours) at 2/1/2023 1130  Last data filed at 2/1/2023 0501  Gross per 24 hour   Intake 767 6 ml   Output 1150 ml   Net -382 4 ml       Invasive Devices     Peripheral Intravenous Line  Duration           Peripheral IV 01/31/23 Dorsal (posterior); Right Forearm 1 day    Peripheral IV 01/31/23 Left Forearm <1 day                Physical Exam  GEN: Milly Muzzy  appears well, alert and oriented x 3, pleasant and cooperative   HEENT:  Normocephalic, atraumatic, anicteric, moist mucous membranes  NECK: No JVD or carotid bruits   HEART: reg rhythm, reg rate, normal S1 and S2, no murmurs, clicks, gallops or rubs   LUNGS: Reduced air entry in bilateral lower lung fields  ABDOMEN:  Normoactive bowel sounds, soft, no tenderness, no distention  EXTREMITIES: peripheral pulses palpable; no edema  NEURO: no gross focal findings; cranial nerves grossly intact   SKIN:  Dry, intact, warm to touch    Lab Results: I have personally reviewed pertinent lab results      Results from last 7 days   Lab Units 02/01/23  0204 01/31/23  2322 01/31/23  1921   HS TNI 0HR ng/L  --  354*  --    HS TNI 2HR ng/L 300*  --   --    HS TNI 4HR ng/L  --   --  340*         Results from last 7 days   Lab Units 02/01/23  0546 01/31/23  0858   POTASSIUM mmol/L 3 8 4 3   CO2 mmol/L 29 27   CHLORIDE mmol/L 103 105   BUN mg/dL 17 13   CREATININE mg/dL 0 68 1 04     Results from last 7 days   Lab Units 02/01/23  0546 01/31/23  1707 01/31/23  0858   HEMOGLOBIN g/dL 11 8* 13 6 15 3   HEMATOCRIT % 36 4* 41 9 48 8   PLATELETS Thousands/uL 130* 147* 189     Results from last 7 days   Lab Units 02/01/23  0546   TRIGLYCERIDES mg/dL 70   HDL mg/dL 96   LDL CALC mg/dL 87   LDL DIRECT mg/dl 90   HEMOGLOBIN A1C % 5 4     Results from last 7 days   Lab Units 02/01/23  0830 01/31/23  2322 01/31/23  1707   INR   --   --  0 98   PTT seconds 66* 43* 24       Imaging: I have personally reviewed pertinent reports  ECHO:  Results for orders placed during the hospital encounter of 01/31/23    Echo complete w/ contrast if indicated    Interpretation Summary  •  Left Ventricle: Left ventricular cavity size is normal  Wall thickness is normal  The left ventricular ejection fraction is 25%  Systolic function is severely reduced  There is moderate global hypokinesis  Diastolic function is mildly abnormal, consistent with grade I (abnormal) relaxation  •  Right Ventricle: Right ventricular cavity size is normal  Systolic function is normal     Anticoagulation VTE Prophylaxis: Heparin     Counseling / Coordination of Care  Total floor / unit time spent today 1 hour minutes  Greater than 50% of total time was spent with the patient and / or family counseling and / or coordination of care  A description of the counseling / coordination of care:       Epic/ Allscripts/Care Everywhere records reviewed:     ** Please Note: Fluency DirectDictation voice to text software may have been used in the creation of this document   **

## 2023-02-01 NOTE — PROGRESS NOTES
CRITICAL CARE TRANSFER NOTE     Name: Rustam Haile  Age & Sex: 71 y o  male   MRN: [id number]  Unit/Bed#: [facility] 209-01   Encounter: [id number]  Hospital Stay Days: 1    Reason for ICU Admission: S/p cardiac arrest  Code Status: Level 1 - Full Code  Condition: stable  Disposition: Patient requires Med/Surg with Telemetry    Accepting Physician: Marian Kaye MD  I discussed the case with Dr Ladarius Ewing MD of [place]  I reviewed the patient's ICU course, results of diagnostic tests, consults, and pending tests/consults  I answered all questions  ASSESSMENT/PLAN     Principal Problem:    Cardiac arrest [facility])  Active Problems:    CHF (congestive heart failure) (HCC)    Coronary artery disease    Elevated troponin    Wide-complex tachycardia    Acute hypoxemic respiratory failure (HCC)    Multiple fractures of ribs, bilateral, initial encounter for closed fracture    Foreign body in stomach    Substance use disorder    Mood disorder (HCC)    Pneumothorax    Thrombocytopenia (HCC)    Leukocytosis    Epistaxis    Pneumonia    Psoriasis    * Cardiac arrest [facility])  Assessment & Plan  Presented after cardiac arrest at home, ROSC in the field without defibrillation or medications  1/3 rhythm strips from EMS showed wide complex tachycardia, other sinus rhythm  ? Unclear what rhythm patient was in during arrest    Assessment:  ? ECG on admission showing inferior Q waves and new T wave inversions in the anteroseptal leads on repeat  ? TTE on admission shows LVEF 25%, severely reduced systolic function, N5AF, moderate global hypokinesis  ? Etiology may be primary arrhythmia vs ACS vs hypoxia related to pneumonia vs substance use  Plan:  ? Cardiology following, appreciate recommendations  ? Planned for cath today 02/01  ? ASA 81 daily, statin  ? Start Coreg 6 25 mg bid - consider decreasing to 3 125 mg bid if blood pressures continue to be soft, appreciate cardiology recommendations  ?  Continue amiodarone infusion for now until after cath    CHF (congestive heart failure) ([facility])  Assessment & Plan  Wt Readings from Last 3 Encounters:   02/01/23 82 kg (180 lb 12 4 oz)     Patient with new reduced EF 25% on TTE on admission in setting of recent cardiac arrest and signs of ischemia on ECG  · No history per patient and chart review  · Cardiology following, appreciate recommendations  · S/P Catheterization -  · GDMT as able  - Starting Coreg 6 25 mg bid - consider reduced dose in setting of soft blood pressures and bradycardia  - Holding off on ACEi/ARB/ARNI until cath  - No evidence of acute exacerbation, avoid diuresis at this time  - No need for aldosterone antagonism at this time  - Consider SGLT2i after cath  - Glucose has been wnl, A1c 5 4    Coronary artery disease  Assessment & Plan  S/P cardiac cath performed on 2/1/2023: 99% Prox RCA, 60% Mid RCA, and 60% Mid Cx  S/P angioplasty and DAVID x1    · ASA 81 mg and Brilinta 90 mg, Statin  · Cardiology consulted and appreciate recommendations      Elevated troponin  Assessment & Plan  S/p cardiac arrest - concern for NSTEMI  · ECG 01/31 showed ST depressions in lateral limb leads and anteroseptal leads in setting of chest pain  · Elevated trops to 350s  · Catheterization today, 02/01  · Cardiology following, appreciate recommendations    Psoriasis  Assessment & Plan  On Humira (Adalimumab) outpatient  · Hold Humira inpatient  · Betamethasone or hydrocortisone cream for psoriasis as needed  Pneumonia  Assessment & Plan  Present on CTA PE on admission, likely 2/2 to aspiration during cardiac arrest  · Follow up blood cultures from admission  · Rocephin 1 g q24hr, 5 day course - Day 2/5  · Can broaden antibiotics if clinically indicated  · Trend fever/WBC    Recent Labs     01/31/23  1758 02/01/23  0546   PROCALCITONI 0 22 0 33*       Epistaxis  Assessment & Plan  Reported on 02/01 am, left sided  Intermittent, responded to packing and pressure    · In setting of admitted history of cocaine use and history of nosebleeds  · If continue packing and pressure, saline flush  Consider Afrin  · Discontinue heparin following cath  Leukocytosis  Assessment & Plan  In setting of cardiac arrest, aspiration and pneumonia  · Continue to monitor    Recent Labs     01/31/23  0858 01/31/23  1707 02/01/23  0546   WBC 11 81* 12 12* 11 87*       Thrombocytopenia ([facility] [address] )  Assessment & Plan  189 on admission, 130 today  · Intermittent epistaxis but otherwise no bleeding symptoms  · Continue to monitor CBC    Recent Labs     01/31/23  0858 01/31/23  1707 02/01/23  0546    147* 130*       Pneumothorax  Assessment & Plan  Improved - not appreciable on CXR today  S/p cardiac arrest with CPR with broken ribs  CTA PE on admission shows trace anteriro right pneumothorax subjacent to a rib facture  · Patient in no acute distress and hemodynamically stable on 12 L MFNC  · Wean supplemental oxygen as tolerated  · Stat CXR if patient suddenly requires increased O2 or become hemodynamically unstable  Mood disorder (HCC)  Assessment & Plan  Home medications Abilify 10 mg, Zoloft 100 mg, Wellbutrin 300 mg  Restart following cath    Substance use disorder  Assessment & Plan  Presented to the ED post cardiac arrest, UDS positive for cocaine and THC  Patient admits to cocaine use, reported for back pain, last use 2-3 days ago  ·  on discontinuation of substance use in setting of cardiac arrest, possible ACS and CHF  · Discuss alternative pain control options for back pain    Foreign body in stomach  Assessment & Plan  CTA PE on admission shows metallic material in the lower esophagus; likely a tooth in setting of patient report of loss of a tooth  CXR and KUB show object in stomach 02/01  · Lower concern for ingestion of ilicit substance that may have precipitated cardiac arrest  · Will hold off on GI consult for now   If still in stomach after cath will need GI consult; follow up KUB  · NPO until cath, otherwise cardiac diet    Multiple fractures of ribs, bilateral, initial encounter for closed fracture  Assessment & Plan  Present on CTA PE on admission, anterior rib fractures 2-6 bilaterally 2/2 CPR during cardiac arrest  · Maintain adequate pain control to promote proper respiration, currently prn Dilaudid  · Incentive spirometry as tolerated    Acute hypoxemic respiratory failure [facility])  Assessment & Plan  Patient intubated in the field during cardiac arrest resuscitation, extubated upon arrival to ED  Now saturating 100% 12 L MFNC  Improved mentation and oxygen saturations  · Wean O2 as tolerated  · Incentive spirometry    Wide-complex tachycardia  Assessment & Plan  Present during transport (see media for rhythm strips from EMS)  ? Telemetry  ? Continue amiodarone for now  ? Cardiology consulted, appreciate recommendations  ? EP consulted, appreciate recommendations    Metabolic encephalopathy-resolved as of 2/1/2023  Assessment & Plan  Improved  Patient was somnolent but arousable on admission, frequently falling asleep during conversations  · S/p cardiac arrest with amnesia to the event  · Did no respond to Narcan in the field  · Acidotic on arrival with improving VBG one supplemental oxygen  · UDS positive for THC and cocaine on admission  · Monitor mental status       VTE Pharmacologic Prophylaxis: Heparin  VTE Mechanical Prophylaxis: sequential compression device    [address]  Is a 71 y o  male with PMHx of HTN, Bell's palsy, recurrent MDD, and PTSD who presented to [facility] ER AND [facility] ED via EMS after cardiac arrest at home  He received CPR and ROSC was obtained without defibrillation  Patient denies memory of the incident, and denies substance use prior to the event, last admitted cocaine use 2-3 days ago  UDS positive for THC and cocaine, and family has since reported he was observed snorting cocaine approximately 30 minutes before he was found down   En route a Johnathon airway was placed and ECG showed a run of wide complex tachycardia (strips available in media), but by the time patient arrived in the ED patient was in NSR, awake and alert and attempting to remove the airway himself  He was started on amiodarone infusion without bolus and was weaned to [facility], initially 8L/min now stable at 12 L/min  Vitals also showed elevated heart rates on presentation and hypertension which improved since admission  Labs notable for lactate 4 0 > 1 0 with fluids, mild leukocytosis, mild Hgb depression to 11 8, VBG 7 05/91 5/39 4 on admission improved with resusciatation  Troponins elevated over his admission to peak of 354  CT head was negative for intracranial abnormalities  CTA PE showed no PE, bilateral anterior rib fractures in ribs 2-6 with trace anterior pneumothorax adjacent to a rib fracture, RUL pneumonia vs pneumonitis likely due to aspiration, as well as pulmonary edema with GGO in RUL related to edema vs infection vs atelectasis  CTA also showed metallic material in the lower esophagus suspicious for a tooth due to imaging and patient interview  ECHO showed LVEF of 25% with severe systolic dysfunction, P8LO, and moderate global hypokinesis  ECG evolved to show signs of ischemia  Cardiology consulted, recommended cath and EP consult for ICD for secondary prevention in setting of ECG concerning for ischemia and heart failure  Overnight patient was given Dilaudid for rib pain  Is on 12 L Midflow saturating in the high 90's  Hemodynamically stable  This am had spontaneous epistaxis intermittently  Team was notified by nursing that bleeding had increased and the nose was packed with improvement  Patient underwent cardiac cath on 2/1/2023  Results revealed: 99% Prox RCA, 60% Mid RCA, and 60% Mid Cx  S/P angioplasty and DAVID x1  Patient continued on ASA/Brilinta and heparin gtt discontinued  Following cath, patient remained hemodynamically stable and without any critical care needs   Patient was transferred to the medical surgical floor with telemetry under the service of SOD  OBJECTIVE     Vitals:    02/01/23 0746 02/01/23 1215 02/01/23 1311 02/01/23 1716   BP: 128/74 108/70  130/74   BP Location: Right arm   Right arm   Pulse: (!) 54 (!) 48  (!) 50   Resp: 22 22   Temp: 98 4 °F (36 9 °C) 97 5 °F (36 4 °C)  97 9 °F (36 6 °C)   TempSrc: Oral Oral  Oral   SpO2: 93% 96% 98% 97%   Weight:       Height:         I/O last 24 hours: In: 1767 6 [I V :1717 6; IV Piggyback:50]  Out: 9175 [Urine:1490]    LABORATORY DATA     Labs: I have personally reviewed pertinent reports  Results from last 7 days   Lab Units 02/01/23  0546 01/31/23  1707 01/31/23  0858   WBC Thousand/uL 11 87* 12 12* 11 81*   HEMOGLOBIN g/dL 11 8* 13 6 15 3   HEMATOCRIT % 36 4* 41 9 48 8   PLATELETS Thousands/uL 130* 147* 189   NEUTROS PCT % 84*  --  46   MONOS PCT % 7  --  7      Results from last 7 days   Lab Units 02/01/23  0546 01/31/23  0858   POTASSIUM mmol/L 3 8 4 3   CHLORIDE mmol/L 103 105   CO2 mmol/L 29 27   BUN mg/dL 17 13   CREATININE mg/dL 0 68 1 04   CALCIUM mg/dL 8 4 8 8   ALK PHOS U/L 66 103   ALT U/L 41 37   AST U/L 46* 39     Results from last 7 days   Lab Units 02/01/23  0546   MAGNESIUM mg/dL 2 3     Results from last 7 days   Lab Units 02/01/23  0546   PHOSPHORUS mg/dL 3 1      Results from last 7 days   Lab Units 02/01/23  0830 01/31/23  2322 01/31/23  1707   INR   --   --  0 98   PTT seconds 66* 43* 24     Results from last 7 days   Lab Units 01/31/23  1159   LACTIC ACID mmol/L 1 0         Micro:  Lab Results   Component Value Date    BLOODCX No Growth at 24 hrs  01/31/2023    BLOODCX No Growth at 24 hrs  01/31/2023     IMAGING & DIAGNOSTIC TESTING     Imaging: I have personally reviewed pertinent reports     and I have personally reviewed pertinent films in PACS    XR abdomen 1 view kub    Result Date: 2/1/2023  Impression: Opaque foreign body in the left upper quadrant of the abdomen is unchanged in position as of the latest study on 2/1/2023 at 4:54 AM  This could represent a tooth fragment and should be correlated with physical exam the oral cavity  Workstation performed: [id number]     XR abdomen 1 view kub    Result Date: 2/1/2023  Impression: Opaque foreign body in the left upper quadrant of the abdomen is unchanged in position as of the latest study on 2/1/2023 at 4:54 AM  This could represent a tooth fragment and should be correlated with physical exam the oral cavity  Workstation performed: [id number]     CT head without contrast    Result Date: 1/31/2023  Impression: No evidence of acute intracranial process  Chronic microangiopathy  Workstation performed: [id number]     XR chest portable ICU    Result Date: 2/1/2023  Impression: Foreign body appears to be in the left upper quadrant of the abdomen  Hazy density probably represents effusion  Left lower lobe atelectasis versus infiltrate  Workstation performed: [id number]     CTA ED chest PE Study    Result Date: 1/31/2023  Impression: 1  No pulmonary embolism  2   Bilateral anterior 2nd- 6th rib fractures  Trace anterior right pneumothorax subjacent to a rib fracture  3   Right upper lobe pneumonia, likely due to aspiration  4   Interlobular septal thickening likely represents pulmonary edema  Groundglass opacity in the medial right upper lobe may be related to edema, infection or atelectasis  5   Significant bilateral posterior lower lobe segmental atelectasis  6   Metallic material in the lower esophagus  Finding may represent a foreign body  Correlation with prior procedural history is also advised  I personally discussed this study with Matilde Montes on 1/31/2023 at 11:20 AM  Workstation performed: [id number]     EKG, Pathology, and Other Studies: I have personally reviewed pertinent reports       ALLERGIES   No Known Allergies    MEDICATIONS     Current Facility-Administered Medications   Medication Dose Route Frequency Provider Last Rate   • acetaminophen  650 mg Oral Q6H PRN Hosea Calhoun DO     • amiodarone  1 mg/min Intravenous Continuous Hosea Calhoun DO 1 mg/min (02/01/23 0447)   • aspirin  81 mg Oral Daily Amlori Brandt, DO     • atorvastatin  80 mg Oral Daily With Dinner JONATAN Styles     • carvedilol  6 25 mg Oral BID With Meals Amlori García, DO     • cefTRIAXone  1,000 mg Intravenous Q24H Hosea Calhoun DO Stopped (01/31/23 1800)   • chlorhexidine  15 mL Mouth/Throat Q12H [facility], DO     • heparin (porcine)  5,000 Units Subcutaneous Q8H [address] [facility], [facility]     • HYDROmorphone  0 5 mg Intravenous Q4H PRN Curtis Mathis, DO     • insulin lispro  1-6 Units Subcutaneous Q6H [address] Hosea Calhoun, DO     • insulin lispro  1-6 Units Subcutaneous  Hosea Calhoun DO     • ticagrelor  90 mg Oral Q12H [address] JONATAN Styles     • trimethobenzamide  200 mg Intramuscular Q6H PRN Hosea Calhoun DO       amiodarone, 1 mg/min, Last Rate: 1 mg/min (02/01/23 0447)      acetaminophen, 650 mg, Q6H PRN  HYDROmorphone, 0 5 mg, Q4H PRN  trimethobenzamide, 200 mg, Q6H PRN      ==

## 2023-02-01 NOTE — DISCHARGE INSTR - AVS FIRST PAGE
1  Please see the post angioplasty discharge instructions  No heavy lifting, greater than 10 lbs  or strenuous activity for 5 days  Follow angioplasty discharge instructions  2 Remove band aid tomorrow  Shower and wash area- wrist gently with soap and water- beginning tomorrow  Rinse and pat dry  Apply new water seal band aid  Repeat this process for 5 days  No powders, creams lotions or antibiotic ointments  for 5 days  No tub baths, hot tubs or swimming for 5 days  3  Call [facility]  Cardiology Office ([phone]) if you develop a fever, redness or drainage at your wrist access site  4  No driving for 2 days    5  Do not stop aspirin or Plavix (clopidogrel) any reason without a cardiologist’s consent, or the stent could block up and cause a heart attack  6  Stent card and book  Medication Changes:  Start aspirin 81 mg daily  Start atorvastatin 80 mg daily  Start carvedilol 6 25 mg 2 times daily with meals  Start Plavix 75 mg daily  Do not take Brilinta, this was changed to Plavix    Start losartan 25 mg daily  Take Tylenol 6 975 mg as needed every 8 hours for mild rib pain  Use lidocaine patch-1 patch over 12 hours daily for rib pain  Take oxycodone 5 mg as needed every 4 hours as needed for severe rib pain

## 2023-02-01 NOTE — CASE MANAGEMENT
Case Management Assessment & Discharge Planning Note    Patient name Consuelo Mckeon  Location BE CATH LAB VIR/BE CATH * MRN [id number]  : 1950 Date 2023       Current Admission Date: 2023  Current Admission Diagnosis:Cardiac arrest [facility])   Patient Active Problem List    Diagnosis Date Noted   • Mood disorder ([facility] [address] ) 2023   • Pneumothorax 2023   • Thrombocytopenia ([facility] [address] ) 2023   • Leukocytosis 2023   • Epistaxis 2023   • Pneumonia 2023   • Psoriasis 2023   • Cardiac arrest ([facility] [address] ) 2023   • Wide-complex tachycardia 2023   • Elevated troponin 2023   • [facility] 2023   • Acute hypoxemic respiratory failure ([facility] ) 2023   • Multiple fractures of ribs, bilateral, initial encounter for closed fracture 2023   • Foreign body in stomach 2023   • Substance use disorder 2023      LOS (days): 1  Geometric Mean LOS (GMLOS) (days): 4 10  Days to GMLOS:2 9     OBJECTIVE:    Risk of Unplanned Readmission Score: 17 46         Current admission status: Inpatient       Preferred Pharmacy:   [id number] [address] [id number] [address] [address]  Phone: [phone] Fax: [phone]    Primary Care Provider: No primary care provider on file  Primary Insurance: [employer]  Secondary Insurance:     ASSESSMENT:  Active Health Care Proxies    There are no active Health Care Proxies on file                   Readmission Root Cause  30 Day Readmission: No    Patient Information  Admitted from[de-identified] Home  Mental Status: Alert (currently off floor)  During Assessment patient was accompanied by: Spouse  Assessment information provided by[de-identified] Spouse  Primary Caregiver: Self  Support Systems: Self, Spouse/significant other  [place] of Residence: [address] [place] [address] do you live in?: [place]  Type of Current Residence: [place]  In the last 12 months, was there a time when you were not able to pay the mortgage or rent on time?: No  In the last 12 months, was there a time when you did not have a steady place to sleep or slept in a shelter (including now)?: No  Homeless/housing insecurity resource given?: N/A  Living Arrangements: Lives w/ Spouse/significant other  Is patient a ?: Yes  Is patient active with [facility])?: Yes ([address])  Is patient service connected?: Yes (80% service connected)    Activities of Daily Living Prior to Admission  Functional Status: Independent  Completes ADLs independently?: Yes  Ambulates independently?: Yes  Does patient use assisted devices?: No  Does patient currently own DME?: No  Does patient have a history of Outpatient Therapy (PT/OT)?: No  Does the patient have a history of Short-Term Rehab?: No  Does patient have a history of HHC?: No  Does patient currently have [email or web address]?: No         Patient Information Continued  Income Source: Employed  Does patient have prescription coverage?: Yes  Within the past 12 months, you worried that your food would run out before you got the money to buy more : Never true  Within the past 12 months, the food you bought just didn't last and you didn't have money to get more : Never true  Food insecurity resource given?: N/A  Does patient receive dialysis treatments?: No  Does patient have a history of substance abuse?: Yes  Historical substance use preference: Alcohol/ETOH, Cocaine, "Crack" cocaine  Is patient currently in treatment for substance abuse?: No  Treatment options provided  Does patient have a history of Mental Health Diagnosis?: Yes (depression, PTSD)  Is patient receiving treatment for mental health?: Yes (medication management, no therapy or counseling)  Has patient received inpatient treatment related to mental health in the last 2 years?: No (as per wife has a history of suicidal attempt, 10 years ago)         Means of [employer] of Transport to Appts[de-identified] Drives Self  In the past 12 months, has lack of transportation kept you from medical appointments or from getting medications?: No  In the past 12 months, has lack of transportation kept you from meetings, work, or from getting things needed for daily living?: No  Was application for public transport provided?: N/A        DISCHARGE DETAILS:    Discharge planning discussed with[de-identified] patient's wife Chantelle Yates at bedside  Freedom of Choice: Yes     CM contacted family/caregiver?: Yes  Were Treatment Team discharge recommendations reviewed with patient/caregiver?: Yes  Did patient/caregiver verbalize understanding of patient care needs?: Yes  Were patient/caregiver advised of the risks associated with not following Treatment Team discharge recommendations?: Yes    Contacts  Patient Contacts: Emmett Phelps, wife  Relationship to Patient[de-identified] Family  Contact Method: Phone, In Person  Phone Number: [phone]  Reason/Outcome: Continuity of Care, Emergency Contact, Discharge Planning              Other Referral/Resources/Interventions Provided:  Interventions: Substance Abuse Treatment         Treatment Team Recommendation: Other (TBD)  Discharge Destination Plan[de-identified] Other (TBD -- awaiting recommendations)  Transport at Discharge : Family                Patient/caregiver received discharge checklist   Content reviewed  Patient/caregiver encouraged to participate in discharge plan of care prior to discharge home  CM reviewed d/c planning process including the following: identifying help at home, patient preference for d/c planning needs, Discharge Lounge, [facility] Meds to Bed program, availability of treatment team to discuss questions or concerns patient and/or family may have regarding understanding medications and recognizing signs and symptoms once discharged  CM also encouraged patient to follow up with all recommended appointments after discharge   Patient advised of importance for patient and family to participate in managing patient’s medical well being  Additional Comments: Introduced self and role to patient's wife Chhaya Bentley at bedside, patient off floor for cardiac cath  Wife stated that patient has a history of crack cocaine use, but has not had any treatment, was "scared" into sobriety when former girlfriend   Wife also indicated patient has a history of suicidal plan and was subsequently hospitalized, approximately 10 years ago  Chhaya Bentley states that patient's current drug use is a result of patient attempting to "keep up" with his current employment, which enables him to financially assist his family  CM asked Chhaya Bentley what she feels the d/c plan should be, she indicated "home with me "  Discussed the possibility of a HOST referral and that CM would be back to discuss the d/c plan with patient

## 2023-02-01 NOTE — PROGRESS NOTES
Daily Progress Note - Critical Care   Adonay Greene  71 y o  male MRN: [id number]  Unit/Bed#: ICCU 209-01 Encounter: [id number]        ----------------------------------------------------------------------------------------  HPI:  Adonay Greene  Is a 71 y o  male with PMHx of HTN, Bell's palsy, recurrent MDD, and PTSD who presented to [facility] ED via EMS after cardiac arrest at home  He received CPR and ROSC was obtained without defibrillation  Patient denies memory of the incident, and denies substance use prior to the event, last admitted cocaine use 2-3 days ago  UDS positive for THC and cocaine, and family has since reported he was observed snorting cocaine approximately 30 minutes before he was found down  En route a Johnathon airway was placed and ECG showed a run of wide complex tachycardia (strips available in media), but by the time patient arrived in the ED patient was in NSR, awake and alert and attempting to remove the airway himself  He was started on amiodarone infusion without bolus and was weaned to MATILDE GUAMAN  [facility], initially 8L/min now stable at 12 L/min  Vitals also showed elevated heart rates on presentation and hypertension which improved since admission  Labs notable for lactate 4 0 > 1 0 with fluids, mild leukocytosis, mild Hgb depression to 11 8, VBG 7 05/91 5/39 4 on admission improved with resusciatation  Troponins elevated over his admission to peak of 354  CT head was negative for intracranial abnormalities  CTA PE showed no PE, bilateral anterior rib fractures in ribs 2-6 with trace anterior pneumothorax adjacent to a rib fracture  Also revealed RUL pneumonia vs pneumonitis likely due to aspiration, as well as pulmonary edema with GGO in RUL related to edema vs infection vs atelectasis  CTA also showed metallic material in the lower esophagus suspicious for a tooth due to imaging and patient interview  ECHO showed LVEF of 25% with severe systolic dysfunction, A1CV, and moderate global hypokinesis   ECG evolved to show signs of ischemia  24hr events:  Cardiology consulted, recommended cath and EP consult for ICD for secondary prevention in setting of ECG concerning for ischemia and heart failure  Overnight patient was given Dilaudid for rib pain  Is on 12 L Midflow saturating in the high 90's  Hemodynamically stable  This am had spontaneous epistaxis intermittently  Team was notified by nursing that bleeding had increased and the nose was packed  ---------------------------------------------------------------------------------------  SUBJECTIVE  Patient was seen at bedside, lying supine, A&Ox3  He complains of central chest tenderness, worse with cough and deep inspiration, shortness of breath due to chest pain, and cough  Also has intermittent epistaxis of left nostril  No other complaints  He currently denies fevers, chills, headaches, dizziness, palpitations, abdominal pain, nausea, vomiting, constipation, diarrhea, urinary frequency, dysuria, and new extremity weakness, swelling and pain  Reports uncomfortable due to pain during sleep, good appetite  Review of Systems   Constitutional: Negative for chills and fever  HENT: Positive for nosebleeds (Left nostril)  Negative for congestion, rhinorrhea and sore throat  Eyes: Negative for pain and visual disturbance  Respiratory: Negative for cough, chest tightness, shortness of breath, wheezing and stridor  Cardiovascular: Positive for chest pain (Superficial, mainly when coughing or breathing deeply in setting of rib fractures from CPR)  Negative for palpitations and leg swelling  Gastrointestinal: Negative for abdominal pain, constipation, diarrhea, nausea and vomiting  Genitourinary: Negative for dysuria and hematuria  Musculoskeletal: Negative for arthralgias and back pain  Skin: Negative for color change, pallor and rash  Neurological: Negative for dizziness, syncope, light-headedness, numbness and headaches  Psychiatric/Behavioral: Negative for behavioral problems  All other systems reviewed and are negative  Review of systems was reviewed and negative unless stated above in HPI/24-hour events   ---------------------------------------------------------------------------------------  Problem List  1  Cardiac arrest - out of hospital, ROSC achieved by EMS  2  Wide-complex tachycardia - on EMS strips after ROSC, reperfusion artifact vs arrhythmia - currently on amiodarone drip  3  NSTEMI - elevated troponin and ECG changes c/w MI  4  [place] encephalopathy - currently awake and alert  5  Acute systolic heart failure - either 2/2 arrest vs preceeding condition  6  Acute hypoxemic respiratory failure  7  Small R anterior pneumothoraxi - adjacent rib fractures  8  Mulitple bl broken ribs due to CPR  9  Foreign body in esophagus on CT - in stomach on KUB, appears metalic, possibly tooth  10  Aspiration pneumonitis vs pneumonia  11  Substance use disorder - recent cocaine use per patient, UDS positive for cocaine and THC  12   Former smoker    Assessment and Plan:    Neuro:   • Diagnosis: Acute toxic metabolic encephalopathy   o Improved  o Patient was somnolent but arousable on admission, frequently falling asleep during conversations  o Did not respond to Narcan in the field  o UDS positive for THC and cocaine on admission  o S/p cardiac arrest with amnesia to the event  o Acidotic on arrival with improving VBG on supplemental oxygen  o Monitor mental status  • Diagnosis: Mood disorder  o At home on Abilify, Zoloft and Wellbutrin  o Plan: Hold home medications for now, consider restarting following cath today  • Diagnosis: Substance use  o Patient admits to cocaine use, reported for back pain  o Reports last use 2-3 days ago  o UDS positive for cocaine and THC  o  on discontinuation of substance use in setting of cardiac arrest, possible ACS and CHF  o Discuss alternative pain control options for back pain  · Pain controlled with: prn Dilaudid  · Delirium Precautions  · CAM ICU per protocol  · Regulate sleep/wake cycle  · Trend neuro exam      CV:   • Diagnosis: Cardiac arrest  o Presented after cardiac arrest at home  o ROSC in the filed  o Rhythm strips from EMS showed wide complex tachycardia  o Unclear what rhythm patient was in during arrest, but no medications or defibrillations were given per chart review  o ECG on admission showing inferior Q waves and new T wave inversions in the anteroseptal leads on repeat    o TTE on admission shows LVEF 25%, severely reduced systolic function, D0UT, moderate global hypokinesis  o Etiology may be primary arrhythmia vs ACS vs hypoxia related to pneumonia vs substance use  Plan:  o Cardiology consulted, appreciate recommendations  o Planned for cath today  o ASA 81 daily, statin  o Start Coreg 6 25 mg bid  o Continue amiodarone infusion  • Diagnosis: Wide complex tachycardia  o Present during transport (see media for rhythm strips from EMS)  o Cardiology consulted, appreciate recommendations  o Per last note, EP consult for ICD for secondary prevention following cath  • Diagnosis: Heart failure with reduced EF  o Patient with new reduced EF 25% on TTE on admission  o No history per patient and chart review  o In setting of recent cardiac arrest  o Cardiology following, appreciate recommendations  o Catheterization today 02/01  o GDMT as able  - Starting Coreg 6 25 mg bid  - Holding off on ACEi/ARB/ARNI until cath  - No evidence of acute exacerbation, avoid diuresis at this time  - No need for aldosterone antagonism at this time  - Consider SGLT2i after cath  • Glucose has been wnl, A1c 5 4  • Strict I/O  • Daily standing weights  · Rhythm: Sinus Bradycardia  · Follow rhythm on telemetry      Pulm:  • Diagnosis: Acute hypoxic hypercapnic respiratoryf ailure  o Patient intubated in the field during cardiac arrest resuscitation  o Extubated upon arrival to ED  o No saturating 100% 12 L MFNC  o Goal SpO2 as close to 100% as possible in setting of PTX and suspected ischemia  o Wean O2 as tolerated  o Incentive spirometry  • Diagnosis: Aspiration event  o CTA PE on admission shows: interlobar septal thickening likely represntative of edema  Ground glass opacity in the medial right upper lobe edema vs infection vs atelectasis  o Likely aspiration in setting of cardiac arrest  o See A/P for pneumonia  o Aspiration precautions  • Diagnosis: Pneumonia  o CTA PE on admission shows signs of RUL pneumonia, likely due to aspiration  o Patient with mild leukocytosis during admission  o Requring oxygen supplementation after extubation, currently 12 L/min MFNC  o BCx obtained on admission  Plan  o In setting of patient intermittent cough, consider Tessalon perls  o F/u final CXR and KUB results  o Start Rocephin 1 g q24hr  o Trend fever and CBC  o Can broaden antibiotics if needed  o Monitor vital signs  • Diagnosis: Rib fracture  o Present on CTA PE on admission, anterior rib fractures 2-6 bilaterally  o 2/2 CPR during cardiac arrest  o Maintain adequate pain control to promote proper respiration  o Incentive spirometry as tolerated  • Diagnosis: Pneumothorax  o CTA PE on admission shows trace anteriro right pneumothorax subjacent to a rib facture  o Patient in no acute distress and hemodynamically stable on 12 L [address]  o Wean supplemental oxygen as tolerated to keep SpO2 to 100 to promote resolution of PTX  o Stat CXR if patient suddenly requires increased O2 or become hemodynamically unstable        GI:   • Diagnosis: Foreign body  o Plan: CTA PE on admission shows metallic material in the lower esophagus; likely a tooth in setting of patient report of loss of a tooth   o Concern for ingestion of ilicit substance that may have precipitated cardiac arrest  o CXR and KUB show object in stomach 02/01  o F/u final CXR and KUB results  o Will hold off on GI consult for now until after cath  o In setting of hearing loss, will need to investigate if it is hearing aid and if need GI consult  o NPO until cath, otherwise cardiac diet      :   • Diagnosis: No acute issues  · Kidney function normal at baseline on admission  · Cath tomorrow  · Monitor kidney function daily  · Avoid nephrotoxins      F/E/N:   • F: No continues IVF, on heparin and amio drip  • E: Replete electrolytes with goals: K >4 0, Mag >2 0, and Phos >3 0  · N - cardiac diet after cath      Heme/Onc:   • Diagnosis: Epistaxis  o Packing and pressure  o Will re-evaluate if bleeding does not respond to packing and pressure  o Consider Afrin  o Consider discontining heparin if bleeding continues or worsens, but at this time in setting of concern for ischemia will continue heparin  • Diagnosis: Leukocytosis  o Mild elevation 11 81 on admission>12 12>11 87 today  o In setting of cardiac arrest, and signs of aspiration  o Continue to monitor  • Diagnosis: Thrombocytopenia  o 189 on admission, 130 today  o Intermittent epistaxis but otherwise no bleeding symptoms  o Continue to monitor CBC  · Trend hgb and plts  · Transfuse as needed for goal hgb >8      Endo:   • Diagnosis: No acute active issues  o Glucose stable wnl  o A1c pending      ID:   • Diagnosis: Right upper lobe pneumonia  o Present on CTA PE on admission  o Likely due to aspiration during cardiac arrest  o Follow up blood cultures from admission  o Follow up procal  o Start Rocephin 1 g q24hr  o Trend fever/WBC  o Can broaden antibiotics if clinically indicated      MSK/Skin:   • Diagnosis: Psoriasis  o On Humira (Adalimumab) outpatient  o Hold inpatient  o Betamethasone or hydrocortisone cream for psoriasis as needed  o Frequent turning and pressure off-loading    Lines:  Invasive Devices     Peripheral Intravenous Line  Duration           Peripheral IV 01/31/23 Dorsal (posterior); Right Forearm <1 day    Peripheral IV 01/31/23 Left Forearm <1 day            ·   · Disposition: Continue Critical Care   Code Status: Level 1 - Full Code  ---------------------------------------------------------------------------------------  ICU CORE MEASURES    Prophylaxis   VTE Pharmacologic Prophylaxis: Heparin  VTE Mechanical Prophylaxis: sequential compression device    Invasive Devices Review  Invasive Devices     Peripheral Intravenous Line  Duration           Peripheral IV 23 Dorsal (posterior); Right Forearm <1 day    Peripheral IV 23 Left Forearm <1 day              Can any invasive devices be discontinued today?  Not applicable  ---------------------------------------------------------------------------------------  OBJECTIVE    Vitals     Temp:  [97 7 °F (36 5 °C)-98 4 °F (36 9 °C)] 98 4 °F (36 9 °C)  HR:  [52-98] 54  Resp:  [14-39] 39  BP: (118-212)/() 118/68  Vitals:    23 0359 23 0445 23 0515 23 0600   BP:  130/78 118/68    BP Location:       Pulse:  (!) 52 (!) 54    Resp:       Temp:    98 4 °F (36 9 °C)   TempSrc:    Oral   SpO2: 97% 99% 99%    Weight:    82 kg (180 lb 12 4 oz)   Height:               Temp (24hrs), Av °F (36 7 °C), Min:97 7 °F (36 5 °C), Max:98 4 °F (36 9 °C)    Current Temperature: 98 4 °F (36 9 °C)  Temp (24hrs), Av °F (36 7 °C), Min:97 7 °F (36 5 °C), Max:98 4 °F (36 9 °C)  Current: Temperature: 98 4 °F (36 9 °C)  Patient Vitals for the past 24 hrs:   BP Temp Temp src Pulse Resp SpO2 Height Weight   23 0600 -- 98 4 °F (36 9 °C) Oral -- -- -- -- 82 kg (180 lb 12 4 oz)   23 0515 118/68 -- -- (!) 54 -- 99 % -- --   23 0445 130/78 -- -- (!) 52 -- 99 % -- --   23 0359 -- -- -- -- -- 97 % -- --   23 -- -- -- 64 -- 98 % -- --   23 -- -- -- 58 -- 99 % -- --   23 -- -- -- 58 -- 99 % -- --   23 128/74 -- -- 58 -- 100 % -- --   23 -- -- -- -- -- -- 6' (1 829 m) 83 kg (182 lb 14 4 oz)   23 2040 131/66 98 °F (36 7 °C) Oral 62 -- 99 % -- --   23 1900 153/86 -- -- 76 (!) 39 98 % -- --   23 1800 154/85 -- -- 58 21 97 % -- --   23 1737 161/71 -- -- 59 -- -- -- --   23 1600 132/73 -- -- 58 18 94 % -- --   23 1515 134/66 -- -- 60 (!) 27 95 % -- --   23 1511 -- -- -- -- -- -- 6' (1 829 m) 80 3 kg (177 lb)   23 1430 132/76 -- -- (!) 54 -- -- 6' (1 829 m) 80 3 kg (177 lb)   23 1415 132/76 -- -- (!) 54 -- -- -- --   23 1315 132/76 -- -- (!) 53 14 94 % -- --   23 1130 126/85 -- -- 67 18 93 % -- --   23 1030 166/89 -- -- 81 (!) 23 91 % -- --   23 0915 (!) 187/101 -- -- 78 (!) 25 99 % -- --   23 0900 -- 97 7 °F (36 5 °C) Rectal -- -- -- -- --   23 0853 (!) 212/131 -- -- 92 (!) 24 93 % -- --   23 0850 (!) 212/131 -- -- 98 -- 100 % -- --    Body mass index is 24 52 kg/m²  Temperature:   Tmax: Temp (24hrs), Av °F (36 7 °C), Min:97 7 °F (36 5 °C), Max:98 4 °F (36 9 °C)    Current Temperature: 98 4 °F (36 9 °C)      Respiratory:  SpO2: SpO2: 100 %, SpO2 Activity: SpO2 Activity: At Rest, SpO2 Device: O2 Device: Mid flow nasal cannula       Invasive/non-invasive ventilation settings   Respiratory    Lab Data (Last 4 hours)    None         O2/Vent Data (Last 4 hours)    None              No results found for: PHART, XRN8VPV, PO2ART, KKT5FVK, [id number], BEART, SOURCE    Physical Exam  Vitals and nursing note reviewed  Constitutional:       General: He is not in acute distress  Appearance: Normal appearance  He is well-developed  He is not ill-appearing, toxic-appearing or diaphoretic  HENT:      Head: Normocephalic and atraumatic  Nose: No rhinorrhea  Left Nostril: Epistaxis (intermittent, no obvious hemorraging vessels on visual inspecation) present  No foreign body, septal hematoma or occlusion  Mouth/Throat:      Mouth: Mucous membranes are moist       Pharynx: Oropharynx is clear  Eyes:      Extraocular Movements: Extraocular movements intact  Conjunctiva/sclera: Conjunctivae normal    Cardiovascular:      Rate and Rhythm: Normal rate and regular rhythm  Pulses: Normal pulses  Heart sounds: Normal heart sounds  No murmur heard  Pulmonary:      Effort: Pulmonary effort is normal  No respiratory distress  Breath sounds: No wheezing  Chest:      Chest wall: Tenderness (in setting of CPR with broken ribs) present  Abdominal:      General: Abdomen is flat  Bowel sounds are normal  There is no distension  Palpations: Abdomen is soft  Tenderness: There is no abdominal tenderness  Musculoskeletal:      Cervical back: Neck supple  Right lower leg: No edema  Left lower leg: No edema  Skin:     General: Skin is warm and dry  Capillary Refill: Capillary refill takes less than 2 seconds  Neurological:      General: No focal deficit present  Mental Status: He is alert and oriented to person, place, and time  Cranial Nerves: No cranial nerve deficit     Psychiatric:         Mood and Affect: Mood normal          Behavior: Behavior normal          Laboratory and Diagnostics:  Results from last 7 days   Lab Units 01/31/23  1707 01/31/23  0858   WBC Thousand/uL 12 12* 11 81*   HEMOGLOBIN g/dL 13 6 15 3   HEMATOCRIT % 41 9 48 8   PLATELETS Thousands/uL 147* 189   NEUTROS PCT %  --  46   MONOS PCT %  --  7     Results from last 7 days   Lab Units 01/31/23  0858   SODIUM mmol/L 138   POTASSIUM mmol/L 4 3   CHLORIDE mmol/L 105   CO2 mmol/L 27   ANION GAP mmol/L 6   BUN mg/dL 13   CREATININE mg/dL 1 04   CALCIUM mg/dL 8 8   GLUCOSE RANDOM mg/dL 202*   ALT U/L 37   AST U/L 39   ALK PHOS U/L 103   ALBUMIN g/dL 4 4   TOTAL BILIRUBIN mg/dL 0 50          Results from last 7 days   Lab Units 01/31/23  2322 01/31/23  1707   INR   --  0 98   PTT seconds 43* 24          Results from last 7 days   Lab Units 01/31/23  1159 01/31/23  0858   LACTIC ACID mmol/L 1 0 4 0*     ABG:    VBG:  Results from last 7 days   Lab Units 01/31/23  1758   PH YVES  7 288*   PCO2 YVES mm Hg 60 4*   PO2 YVES mm Hg 60 1*   HCO3 YVES mmol/L 28 3   BASE EXC YVES mmol/L 0 3     Results from last 7 days   Lab Units 01/31/23  1758   PROCALCITONIN ng/ml 0 22     No results found for: Gavi Jere  Results from last 7 days   Lab Units 01/31/23  0929 01/31/23  0916   BLOOD CULTURE  Received in Microbiology Lab  Culture in Progress  Received in Microbiology Lab  Culture in Progress  EKG: Sinus bradycardia, st & t- wave abnormality suspicious for ischemia  Imaging:     CTA PE 01/31: "1  No pulmonary embolism  2   Bilateral anterior 2nd- 6th rib fractures  Trace anterior right pneumothorax subjacent to a rib fracture  3   Right upper lobe pneumonia, likely due to aspiration  4   Interlobular septal thickening likely represents pulmonary edema  Groundglass opacity in the medial right upper lobe may be related to edema, infection or atelectasis  5   Significant bilateral posterior lower lobe segmental atelectasis  6   Metallic material in the lower esophagus  Finding may represent a foreign body  Correlation with prior procedural history is also advised "  CT Head 01/31: "No evidence of acute intracranial process "    ECHO:  ECHO 01/31:  "•  Left Ventricle: Left ventricular cavity size is normal  Wall thickness is normal  The left ventricular ejection fraction is 25%  Systolic function is severely reduced  There is moderate global hypokinesis  Diastolic function is mildly abnormal, consistent with grade I (abnormal) relaxation  •  Right Ventricle: Right ventricular cavity size is normal  Systolic function is normal "    I have personally reviewed pertinent reports  and I have personally reviewed pertinent films in PACS    Intake and Output  I/O       01/30 0701  01/31 0700 01/31 0701  02/01 0700    P  O   0    I V  (mL/kg)  1717 6 (20 9)    IV Piggyback  50    Total Intake(mL/kg)  1767 6 (21 6)    Urine (mL/kg/hr)  1150    Total Output  1150 Net  +617 6              UOP: 1150 ml/24hr     Height and Weights   Height: 6' (182 9 cm)  IBW (Ideal Body Weight): 77 6 kg  Body mass index is 24 52 kg/m²  Weight (last 2 days)     Date/Time Weight    02/01/23 0600 82 (180 78)    01/31/23 2100 83 (182 9)    01/31/23 1511 80 3 (177)    01/31/23 1430 80 3 (177)            Nutrition       Diet Orders   (From admission, onward)             Start     Ordered    01/31/23 1841  Diet NPO  Diet effective midnight        References:    Nutrtion Support Algorithm Enteral vs  Parenteral   Question Answer Comment   Diet Type NPO    RD to adjust diet per protocol?  Yes        01/31/23 1841                  Active Medications  Scheduled Meds:  Current Facility-Administered Medications   Medication Dose Route Frequency Provider Last Rate   • acetaminophen  650 mg Oral Q6H PRN [place] Navid, DO     • amiodarone  1 mg/min Intravenous Continuous [place] Sandpoint, DO 1 mg/min (02/01/23 0447)   • aspirin  81 mg Oral Daily Amlori Brandt, DO     • atorvastatin  40 mg Oral Daily With MargaritaBoston DO     • carvedilol  6 25 mg Oral BID With Meals Ammar Og Blocker, DO     • cefTRIAXone  1,000 mg Intravenous Q24H [place] Sandpoint, DO Stopped (01/31/23 1800)   • chlorhexidine  15 mL Mouth/Throat Q12H [facility],      • heparin (porcine)  3-20 Units/kg/hr (Order-Specific) Intravenous Titrated [place] Sandpoint, DO 14 Units/kg/hr (02/01/23 0151)   • heparin (porcine)  2,000 Units Intravenous Q6H PRN [place]on Navid, DO     • heparin (porcine)  4,000 Units Intravenous Q6H PRN [place]on Navid, DO     • HYDROmorphone  0 5 mg Intravenous Q4H PRN Diogo Situ Depope, DO     • insulin lispro  1-6 Units Subcutaneous Q6H [address] [place] Navid, DO     • insulin lispro  1-6 Units Subcutaneous HS Canton Navid, DO     • trimethobenzamide  200 mg Intramuscular Q6H PRN [place]on Sandpoint, DO       Continuous Infusions:  amiodarone, 1 mg/min, Last Rate: 1 mg/min (02/01/23 0447)  heparin (porcine), 3-20 Units/kg/hr (Order-Specific), Last Rate: 14 Units/kg/hr (02/01/23 0151)      PRN Meds:   acetaminophen, 650 mg, Q6H PRN  heparin (porcine), 2,000 Units, Q6H PRN  heparin (porcine), 4,000 Units, Q6H PRN  HYDROmorphone, 0 5 mg, Q4H PRN  trimethobenzamide, 200 mg, Q6H PRN        Allergies   No Known Allergies  ---------------------------------------------------------------------------------------  Advance Directive and Living Will:      Power of :    POLST:    ---------------------------------------------------------------------------------------  Care Time Delivered:   No Critical Care time spent     [facility]      Portions of the record may have been created with voice recognition software  Occasional wrong word or "sound a like" substitutions may have occurred due to the inherent limitations of voice recognition software    Read the chart carefully and recognize, using context, where substitutions have occurred

## 2023-02-02 LAB
ALBUMIN SERPL BCP-MCNC: 3 G/DL (ref 3.5–5)
ALP SERPL-CCNC: 62 U/L (ref 46–116)
ALT SERPL W P-5'-P-CCNC: 37 U/L (ref 12–78)
ANION GAP SERPL CALCULATED.3IONS-SCNC: 6 MMOL/L (ref 4–13)
AST SERPL W P-5'-P-CCNC: 53 U/L (ref 5–45)
BASOPHILS # BLD AUTO: 0.03 THOUSANDS/ÂΜL (ref 0–0.1)
BASOPHILS NFR BLD AUTO: 0 % (ref 0–1)
BILIRUB SERPL-MCNC: 0.47 MG/DL (ref 0.2–1)
BUN SERPL-MCNC: 20 MG/DL (ref 5–25)
CALCIUM ALBUM COR SERPL-MCNC: 9.3 MG/DL (ref 8.3–10.1)
CALCIUM SERPL-MCNC: 8.5 MG/DL (ref 8.3–10.1)
CHLORIDE SERPL-SCNC: 103 MMOL/L (ref 96–108)
CO2 SERPL-SCNC: 26 MMOL/L (ref 21–32)
CREAT SERPL-MCNC: 0.63 MG/DL (ref 0.6–1.3)
EOSINOPHIL # BLD AUTO: 0.03 THOUSAND/ÂΜL (ref 0–0.61)
EOSINOPHIL NFR BLD AUTO: 0 % (ref 0–6)
ERYTHROCYTE [DISTWIDTH] IN BLOOD BY AUTOMATED COUNT: 13.7 % (ref 11.6–15.1)
GFR SERPL CREATININE-BSD FRML MDRD: 99 ML/MIN/1.73SQ M
GLUCOSE SERPL-MCNC: 127 MG/DL (ref 65–140)
GLUCOSE SERPL-MCNC: 130 MG/DL (ref 65–140)
GLUCOSE SERPL-MCNC: 131 MG/DL (ref 65–140)
GLUCOSE SERPL-MCNC: 136 MG/DL (ref 65–140)
GLUCOSE SERPL-MCNC: 142 MG/DL (ref 65–140)
GLUCOSE SERPL-MCNC: 189 MG/DL (ref 65–140)
HCT VFR BLD AUTO: 35.2 % (ref 36.5–49.3)
HGB BLD-MCNC: 11.3 G/DL (ref 12–17)
IMM GRANULOCYTES # BLD AUTO: 0.04 THOUSAND/UL (ref 0–0.2)
IMM GRANULOCYTES NFR BLD AUTO: 0 % (ref 0–2)
LYMPHOCYTES # BLD AUTO: 0.81 THOUSANDS/ÂΜL (ref 0.6–4.47)
LYMPHOCYTES NFR BLD AUTO: 8 % (ref 14–44)
MAGNESIUM SERPL-MCNC: 2.2 MG/DL (ref 1.6–2.6)
MCH RBC QN AUTO: 30.6 PG (ref 26.8–34.3)
MCHC RBC AUTO-ENTMCNC: 32.1 G/DL (ref 31.4–37.4)
MCV RBC AUTO: 95 FL (ref 82–98)
MONOCYTES # BLD AUTO: 0.9 THOUSAND/ÂΜL (ref 0.17–1.22)
MONOCYTES NFR BLD AUTO: 9 % (ref 4–12)
NEUTROPHILS # BLD AUTO: 8.4 THOUSANDS/ÂΜL (ref 1.85–7.62)
NEUTS SEG NFR BLD AUTO: 83 % (ref 43–75)
NRBC BLD AUTO-RTO: 0 /100 WBCS
PHOSPHATE SERPL-MCNC: 1.9 MG/DL (ref 2.3–4.1)
PLATELET # BLD AUTO: 127 THOUSANDS/UL (ref 149–390)
PMV BLD AUTO: 11.5 FL (ref 8.9–12.7)
POTASSIUM SERPL-SCNC: 3.4 MMOL/L (ref 3.5–5.3)
PROT SERPL-MCNC: 6.2 G/DL (ref 6.4–8.4)
RBC # BLD AUTO: 3.69 MILLION/UL (ref 3.88–5.62)
SODIUM SERPL-SCNC: 135 MMOL/L (ref 135–147)
WBC # BLD AUTO: 10.21 THOUSAND/UL (ref 4.31–10.16)

## 2023-02-02 RX ORDER — CLOPIDOGREL BISULFATE 75 MG/1
75 TABLET ORAL DAILY
Status: DISCONTINUED | OUTPATIENT
Start: 2023-02-03 | End: 2023-02-04 | Stop reason: HOSPADM

## 2023-02-02 RX ORDER — LIDOCAINE 50 MG/G
1 PATCH TOPICAL DAILY
Status: DISCONTINUED | OUTPATIENT
Start: 2023-02-02 | End: 2023-02-04 | Stop reason: HOSPADM

## 2023-02-02 RX ORDER — ARIPIPRAZOLE 10 MG/1
10 TABLET ORAL DAILY
Status: DISCONTINUED | OUTPATIENT
Start: 2023-02-02 | End: 2023-02-04 | Stop reason: HOSPADM

## 2023-02-02 RX ORDER — BUPROPION HYDROCHLORIDE 150 MG/1
300 TABLET ORAL DAILY
Status: DISCONTINUED | OUTPATIENT
Start: 2023-02-02 | End: 2023-02-04 | Stop reason: HOSPADM

## 2023-02-02 RX ORDER — POTASSIUM CHLORIDE 20 MEQ/1
40 TABLET, EXTENDED RELEASE ORAL 2 TIMES DAILY
Status: DISCONTINUED | OUTPATIENT
Start: 2023-02-02 | End: 2023-02-02

## 2023-02-02 RX ORDER — POTASSIUM CHLORIDE 20 MEQ/1
40 TABLET, EXTENDED RELEASE ORAL ONCE
Status: COMPLETED | OUTPATIENT
Start: 2023-02-02 | End: 2023-02-02

## 2023-02-02 RX ORDER — AMIODARONE HYDROCHLORIDE 200 MG/1
400 TABLET ORAL
Status: DISCONTINUED | OUTPATIENT
Start: 2023-02-02 | End: 2023-02-03

## 2023-02-02 RX ORDER — SERTRALINE HYDROCHLORIDE 100 MG/1
100 TABLET, FILM COATED ORAL DAILY
Status: DISCONTINUED | OUTPATIENT
Start: 2023-02-02 | End: 2023-02-04 | Stop reason: HOSPADM

## 2023-02-02 RX ORDER — CLOPIDOGREL BISULFATE 75 MG/1
600 TABLET ORAL ONCE
Status: COMPLETED | OUTPATIENT
Start: 2023-02-02 | End: 2023-02-02

## 2023-02-02 RX ADMIN — CEFTRIAXONE 1000 MG: 1 INJECTION, POWDER, FOR SOLUTION INTRAMUSCULAR; INTRAVENOUS at 18:11

## 2023-02-02 RX ADMIN — ARIPIPRAZOLE 10 MG: 10 TABLET ORAL at 16:18

## 2023-02-02 RX ADMIN — CLOPIDOGREL BISULFATE 600 MG: 75 TABLET ORAL at 09:26

## 2023-02-02 RX ADMIN — SACUBITRIL AND VALSARTAN 1 TABLET: 24; 26 TABLET, FILM COATED ORAL at 09:29

## 2023-02-02 RX ADMIN — SERTRALINE 100 MG: 100 TABLET, FILM COATED ORAL at 16:24

## 2023-02-02 RX ADMIN — LIDOCAINE 1 PATCH: 50 PATCH CUTANEOUS at 14:45

## 2023-02-02 RX ADMIN — SACUBITRIL AND VALSARTAN 1 TABLET: 24; 26 TABLET, FILM COATED ORAL at 16:22

## 2023-02-02 RX ADMIN — DICLOFENAC SODIUM 2 G: 10 GEL TOPICAL at 09:29

## 2023-02-02 RX ADMIN — HEPARIN SODIUM 5000 UNITS: 5000 INJECTION INTRAVENOUS; SUBCUTANEOUS at 21:18

## 2023-02-02 RX ADMIN — POTASSIUM CHLORIDE 40 MEQ: 1500 TABLET, EXTENDED RELEASE ORAL at 09:27

## 2023-02-02 RX ADMIN — AMIODARONE HYDROCHLORIDE 400 MG: 200 TABLET ORAL at 16:17

## 2023-02-02 RX ADMIN — ATORVASTATIN CALCIUM 80 MG: 80 TABLET, FILM COATED ORAL at 16:17

## 2023-02-02 RX ADMIN — HYDROMORPHONE HYDROCHLORIDE 0.5 MG: 1 INJECTION, SOLUTION INTRAMUSCULAR; INTRAVENOUS; SUBCUTANEOUS at 01:16

## 2023-02-02 RX ADMIN — HYDROMORPHONE HYDROCHLORIDE 0.5 MG: 1 INJECTION, SOLUTION INTRAMUSCULAR; INTRAVENOUS; SUBCUTANEOUS at 09:28

## 2023-02-02 RX ADMIN — ACETAMINOPHEN 650 MG: 325 TABLET ORAL at 16:17

## 2023-02-02 RX ADMIN — CARVEDILOL 6.25 MG: 6.25 TABLET, FILM COATED ORAL at 16:18

## 2023-02-02 RX ADMIN — HEPARIN SODIUM 5000 UNITS: 5000 INJECTION INTRAVENOUS; SUBCUTANEOUS at 14:46

## 2023-02-02 RX ADMIN — CARVEDILOL 6.25 MG: 6.25 TABLET, FILM COATED ORAL at 09:28

## 2023-02-02 RX ADMIN — ASPIRIN 81 MG CHEWABLE TABLET 81 MG: 81 TABLET CHEWABLE at 09:26

## 2023-02-02 RX ADMIN — HYDROMORPHONE HYDROCHLORIDE 0.5 MG: 1 INJECTION, SOLUTION INTRAMUSCULAR; INTRAVENOUS; SUBCUTANEOUS at 16:17

## 2023-02-02 RX ADMIN — AMIODARONE HYDROCHLORIDE 1 MG/MIN: 50 INJECTION, SOLUTION INTRAVENOUS at 00:15

## 2023-02-02 RX ADMIN — AMIODARONE HYDROCHLORIDE 400 MG: 200 TABLET ORAL at 14:46

## 2023-02-02 RX ADMIN — HEPARIN SODIUM 5000 UNITS: 5000 INJECTION INTRAVENOUS; SUBCUTANEOUS at 05:20

## 2023-02-02 RX ADMIN — BUPROPION HYDROCHLORIDE 300 MG: 150 TABLET, FILM COATED, EXTENDED RELEASE ORAL at 16:18

## 2023-02-02 RX ADMIN — POTASSIUM PHOSPHATE, MONOBASIC AND POTASSIUM PHOSPHATE, DIBASIC 30 MMOL: 224; 236 INJECTION, SOLUTION, CONCENTRATE INTRAVENOUS at 11:42

## 2023-02-02 RX ADMIN — DICLOFENAC SODIUM 2 G: 10 GEL TOPICAL at 21:18

## 2023-02-02 NOTE — PROGRESS NOTES
Cardiology Team 2 Progress Note - Cathy Flowers  71 y o  male MRN: [id number]    Unit/Bed#: MS 76590 Encounter: [id number]    Assessment/Plan:     18-year-old male with history of depression/anxiety and PTSD on Abilify and Wellbutrin who is admitted for out-of-hospital cardiac arrest with ROSC achieved in the field after 5 to 7 minutes of CPR without the use of medications or defibrillation; rhythm strips en route to ED showed wide-complex tachycardia; UDS positive for cocaine, which patient snorted 30minutes prior to cardiac arrest  Initially intubated while en route using Johnathon airway d/t respiratory comprimise, however was extubated on arrival and weaned to [facility] [address] after he was able to follow commands and spontaneously move all 4 extremities  Repeat ECG on admission showing inferior Q waves and T wave inversions in lateral and anteroseptal leads; TTE on admission showing LVEF 25%, G1 DD, moderate global hypokinesis       Hospital Stay Days: 2     Assessment:  #Cardiac arrest; out-of-hospital; ROSC achieved by EMS PTA afer 5-7 minutes of CPR, without use of medications or defibrillation- etiology likely multifactorial inlcuding cocaine-induced vasospasm vs  other substance use vs  ischemic cardiomyopathy vs  scar-mediated re-entry monomorphic VT, given evidence of q waves on ecg  #CAD; S/P LHC 2/1 showing 99% stenosis of proximal RCA treated with DAVID placement to proximal RCA   #Wide-complex ventricular tachycardia; as seen on rhythm strips while en route to ED; no recurrence during present hospital course  #Acute toxic metabolic encephalopathy; resolved  #Cardiomyopathy; reduced EF 25% on TTE on admission in setting of cardiac arrest PTA; no known prior   #Acute hypoxic hypercapnic respiratory failure; improved  #RUL PNA  #Rib fractures; seen on imaging; likely 2/2 CPR performed in field    #R sided pneumothorax; small; in setting of rib fracture  #Cocaine positive, on RDS on admission  #THC positive, on RDS on admisison     Plan:   • Consider stopping amiodarone infusion given no recurrence of arrhythmia during current hospital course  • Continue GDMT including:  ? Coreg 6 25 mg twice daily; consider increasing to 12 5mg within appropriate BP parameters  ? Continue Entresto 4/26 mg  ? Can consider diuretics however currently without S/S volume overload/decompensated HF in setting of severe ischemic cardiomyopathy  ? No indication for aldosterone antagonist at this time  • DAPT with lifelong aspirin 81 mg qd and Plavix 75 mg daily for 6 months   • Continue lipid-lowering tx with statin   • Strict I/O  • Daily weights       Subjective:   Patient seen and examined  Feels much better, does still endorse chest wall pain attributable to rib fractures  Sitting comfortably  Tolerating p o  Denies chest pressure or tightness, shortness of breath, palpitations, dizziness, orthopnea, leg swelling  No other concerns reported  Vitals: Blood pressure 97/54, pulse (!) 48, temperature 97 8 °F (36 6 °C), resp  rate 20, height 6' (1 829 m), weight 77 1 kg (169 lb 15 6 oz), SpO2 93 %  , Body mass index is 23 05 kg/m² ,   Orthostatic Blood Pressures    Flowsheet Row Most Recent Value   Blood Pressure 97/54 filed at 02/02/2023 1121   Patient Position - Orthostatic VS Lying filed at 02/02/2023 1974            Intake/Output Summary (Last 24 hours) at 2/2/2023 1203  Last data filed at 2/2/2023 0943  Gross per 24 hour   Intake 1457 31 ml   Output 350 ml   Net 1107 31 ml       Physical Exam:    GEN: Ann An   appears well, alert and oriented x 3, pleasant and cooperative   HEENT:  Normocephalic, atraumatic, anicteric, moist mucous membranes  NECK: No JVD or carotid bruits   HEART: Regular rhythm, regular rate, normal S1 and S2, no murmurs, clicks, gallops or rubs   LUNGS: Clear to auscultation bilaterally; no wheezes, rales, or rhonchi; respiration nonlabored   ABDOMEN:  Normoactive bowel sounds, soft, no tenderness, no distention  EXTREMITIES: peripheral pulses palpable; no BLE edema  NEURO: no gross focal findings; cranial nerves grossly intact   SKIN:  Dry, intact, warm to touch      Current Facility-Administered Medications:   •  acetaminophen (TYLENOL) tablet 650 mg, 650 mg, Oral, Q6H PRN, Sue upp, DO  •  amiodarone tablet 400 mg, 400 mg, Oral, TID With Meals, Sagar Ch MD  •  aspirin chewable tablet 81 mg, 81 mg, Oral, Daily, [facility], 81 mg at 02/02/23 6199  •  atorvastatin (LIPITOR) tablet 80 mg, 80 mg, Oral, Daily With Dinner, Sue upp, DO, 80 mg at 02/01/23 1809  •  carvedilol (COREG) tablet 6 25 mg, 6 25 mg, Oral, BID With Meals, Sue upp, DO, 6 25 mg at 02/02/23 6414  •  ceftriaxone (ROCEPHIN) 1 g/50 mL in dextrose IVPB, 1,000 mg, Intravenous, Q24H, Sue saritap, DO, Last Rate: 100 mL/hr at 02/01/23 1757, 1,000 mg at 02/01/23 1757  •  [START ON 2/3/2023] clopidogrel (PLAVIX) tablet 75 mg, 75 mg, Oral, Daily, Amirah Simons MD  •  Diclofenac Sodium (VOLTAREN) 1 % topical gel 2 g, 2 g, Topical, 4x Daily, Miri Solis MD, 2 g at 02/02/23 9025  •  heparin (porcine) subcutaneous injection 5,000 Units, 5,000 Units, Subcutaneous, Q8H [facility], DO, 5,000 Units at 02/02/23 0520  •  HYDROmorphone (DILAUDID) injection 0 5 mg, 0 5 mg, Intravenous, Q4H PRN, Sue maxwell, DO, 0 5 mg at 02/02/23 0087  •  insulin lispro (HumaLOG) 100 units/mL subcutaneous injection 1-6 Units, 1-6 Units, Subcutaneous, HS, Sue saritap, DO  •  insulin lispro (HumaLOG) 100 units/mL subcutaneous injection 1-6 Units, 1-6 Units, Subcutaneous, 4 times day **AND** Fingerstick Glucose (POCT), , , 4 times day, Miri Solis MD  •  lidocaine (LIDODERM) 5 % patch 1 patch, 1 patch, Topical, Daily, Alyssa Vigil MD  •  nitroglycerin (NITROSTAT) SL tablet 0 4 mg, 0 4 mg, Sublingual, Q5 Min PRN, Miri Solis MD  •  potassium phosphates 30 mmol in sodium chloride 0 9 % 250 mL infusion, 30 mmol, Intravenous, Once, Clara Pathak MD, Last Rate: 41 7 mL/hr at 02/02/23 1142, 30 mmol at 02/02/23 1142  •  sacubitril-valsartan (ENTRESTO) 24-26 MG per tablet 1 tablet, 1 tablet, Oral, BID, Ebenezer Gardner MD, 1 tablet at 02/02/23 2334  •  trimethobenzamide (TIGAN) IM injection 200 mg, 200 mg, Intramuscular, Q6H PRN, Sue Jefferson DO, 200 mg at 02/01/23 1808    Labs & Results:          Results from last 7 days   Lab Units 02/02/23  0528 02/01/23  0546 01/31/23  0858   POTASSIUM mmol/L 3 4* 3 8 4 3   CO2 mmol/L 26 29 27   CHLORIDE mmol/L 103 103 105   BUN mg/dL 20 17 13   CREATININE mg/dL 0 63 0 68 1 04     Results from last 7 days   Lab Units 02/02/23  0528 02/01/23  0546 01/31/23  1707   HEMOGLOBIN g/dL 11 3* 11 8* 13 6   HEMATOCRIT % 35 2* 36 4* 41 9   PLATELETS Thousands/uL 127* 130* 147*     Results from last 7 days   Lab Units 02/01/23  0546   TRIGLYCERIDES mg/dL 70   HDL mg/dL 96   LDL CALC mg/dL 87   HEMOGLOBIN A1C % 5 4       Telemetry:   Personally reviewed by Laurel Horta DO: NSR, ST depressions in lateral/anteroseptal leads    Imaging:   XR abdomen 1 view kub   Final Result by Sunshine Corona MD (02/01 1046)      Opaque foreign body in the left upper quadrant of the abdomen is unchanged in position as of the latest study on 2/1/2023 at 4:54 AM       This could represent a tooth fragment and should be correlated with physical exam the oral cavity  Workstation performed: [id number]         XR chest portable   Final Result by Caren Stone MD (02/01 3087)      Right midlung opacity/consolidation, decreased from the previous study      Left lower lung opacity may be due to atelectasis or infiltrate            Workstation performed: [id number]         XR chest portable ICU   Final Result by Sunshine Corona MD (02/01 1040)      Foreign body appears to be in the left upper quadrant of the abdomen  Hazy density probably represents effusion        Left lower lobe atelectasis versus infiltrate  Workstation performed: [id number]         XR abdomen 1 view kub   Final Result by Ann Cerda MD (02/01 1046)      Opaque foreign body in the left upper quadrant of the abdomen is unchanged in position as of the latest study on 2/1/2023 at 4:54 AM       This could represent a tooth fragment and should be correlated with physical exam the oral cavity  Workstation performed: [id number]         CT head without contrast   Final Result by Deejay Christy MD (01/31 1108)      No evidence of acute intracranial process  Chronic microangiopathy  Workstation performed: [id number]         CTA ED chest PE Study   Final Result by Shelton Leigh MD (01/31 1121)      1  No pulmonary embolism  2   Bilateral anterior 2nd- 6th rib fractures  Trace anterior right pneumothorax subjacent to a rib fracture  3   Right upper lobe pneumonia, likely due to aspiration  4   Interlobular septal thickening likely represents pulmonary edema  Groundglass opacity in the medial right upper lobe may be related to edema, infection or atelectasis  5   Significant bilateral posterior lower lobe segmental atelectasis  6   Metallic material in the lower esophagus  Finding may represent a foreign body  Correlation with prior procedural history is also advised  I personally discussed this study with [place] Swati on 1/31/2023 at 11:20 AM                               Workstation performed: [id number]         XR abdomen 1 view kub    (Results Pending)           VTE Prophylaxis: Heparin, SCD       Pamela Ortega, DO  PGY-1  [address]      [facility]/ Allscripts/Care Everywhere records reviewed:    ** Please Note: Fluency DirectDictation voice to text software may have been used in the creation of this document   **

## 2023-02-02 NOTE — QUICK NOTE
2/1 @ 9:34PM: Notified by nursing that patient has been asymptomatically bradycardic with rates as low as 45  Patient is on amiodarone drip given recent history of monomorphic VT and cardiac arrest caused by cocaine ingestion that has not recurred during this hospitalization  Patient is also on Coreg 6 5 mg with holding parameter of heart rate 50  Discussed case with cardiology fellow on-call who recommended to maintain current regimen overnight, pending continuing discussions in the morning regarding further need for amiodarone given patient's cardiac arrest and monomorphic VT that has an identified cause and has not recurred      Lucy Gardner MD  [address]  Internal Medicine Residency PGY-1

## 2023-02-02 NOTE — CERTIFIED RECOVERY SPECIALIST
Certified  Note    Patient name: Dunia Cormier  Location: /-01  [facility]: [address] [place] [address]  Attending: Kev Suárez MD MRN [id number]  : 1950  Age: 71 y o  Sex: male Date 2023         Substance Use History:     Social History     Substance and Sexual Activity   Alcohol Use Not Currently        Social History     Substance and Sexual Activity   Drug Use Not Currently     CRS received consult to meet with patient  CRS will follow up today      Miri Felix

## 2023-02-02 NOTE — PROGRESS NOTES
Progress Note - Electrophysiology Cardiology (EP)   tSeve Record  71 y o  male MRN: [id number]  Unit/Bed#: -55 Encounter: [id number]      Consults  PCP: No primary care provider on file  Assessment/Plan     Assessment:  Principal Problem:    Cardiac arrest [facility])  Active Problems:    Wide-complex tachycardia    Elevated troponin    CHF (congestive heart failure) (HCC)    Acute hypoxemic respiratory failure (HCC)    Multiple fractures of ribs, bilateral, initial encounter for closed fracture    Foreign body in stomach    Substance use disorder    Mood disorder (HCC)    Pneumothorax    Thrombocytopenia (HCC)    Leukocytosis    Epistaxis    Pneumonia    Psoriasis    Coronary artery disease      1  [facility] Cardiac Arrest  Likely Drug-Induced (Cocaine and  multiple Percocet's) vs  ICM vs  Scar-Mediated Monomorphic VT (presumable prior inferior MI however at this time no documented arrhythmia)  Patient now s/p PCI of pRCA  --> Per chart review: "Patient reportedly had an argument with his wife, also noted several her Rx Percocets missing  Per step-daughter, patient reportedly consumed cocaine ~30 minutes before being found unresponsive "in preparation for yard work around the house "   --> ROSC s/p 5-7 minutes of CPR  No medications or defibrillation required  --> UDS positive for THC, opiates and Cocaine; Admitted to snorting 10-30 minutes prior to arrest  --> ECG:  Sinus Bradycardia  Inferior Q-Waves with LAD likely 2/2 prior Inferior infarction  Anterolateral TWI (V2-V5 and I + aVL)  Inferior leads with QRS fractionation suggesting scar  (see below)  Raises suspicion for scar mediated VT   --> TTE (1/31/23): On my personal read the EF is 25%  Akinesis of inferior and inferolateral wall  G1DD  RV function preserved  2  Substance Abuse    --> THC and Cocaine  3  Presumable Aspiration Pneumonitis  --> GGO in RUL on CXR  --> Was intubated in the field, however extubated within 24 hours of arrival  4  Small Right-Sided PTX 2/2 Rib Fractures from CPR     Plan:  - c/w Amiodarone but switch IV to 400mg PO TID x7 Days and then 200mg PO Daily thereafter  - LifeVest + GDMT x3 Months, repeat Echo and monitor for improvement in EF upon which we can consider ICD for secondary prevention  - Remainder as per primary      Case discussed and reviewed with Dr Nithin Mcghee pending attending addendum  Thank you for involving us in the care of your patient  Wilmaorrofe Trotter MD  Cardiology Fellow PGY-6    ==========================================================================================    HPI: Junior Espinoza  is a 71y o  year old male who who presented with a out of hospital cardiac arrest 10-30 minutes after cocaine use       Patient has a history of severe depression on Abilify and Wellbutrin, HTN, PTSD  Patient denies memory of incident  Patient states he does not routinely follow up with doctors and denies ever having seen a cardiologist in the past  To his knowledge he has never had a MI in the past but he does recall an event 6-7 years ago where he suddenly passed out and was down "for a few seconds" before coming to  No history of CP, palpitations, lightheadedness, dizziness, PND, orthopnea otherwise  He states he is fairly functional and able to do ADLs without limitations      States he quite using EtOH about a year ago  "Infrequently uses Cocaine" but unable to numerically quantify     EMS rhythm strip (8:23 AM): Sinus tachycardia  Inferior Q-waves with subtle inferior ST elevations  ?Inferior Aneurysm vs  Vasospasm vs  Inferior MI     EMS rhythm strip (8:28 AM): Probable AIVR, ? reperfusion rhythm originating from the inferior septal region  QRS are fractionated suggesting scar      EMS rhythm strip (8:41 AM): Sinus tachycardia  PVC       ECG in ED:  Date: 2/1/23  Interpretation: Sinus Bradycardia  Inferior Q-Waves with LAD likely 2/2 prior Inferior infarction   Anterolateral TWI (V2-V5 and I + aVL)  Inferior leads with QRS fractionation suggesting scar  No appreciable ST elevations in inferior leads          TELEMETRY: personally reviewed by myself Mitra Mares MD 2/2/23: Sinus bradycardia  PVCs    Review of Systems  ROS as noted above in HPI  Historical Information   Past Medical History:   Diagnosis Date   • Depression      Past Surgical History:   Procedure Laterality Date   • CARDIAC CATHETERIZATION Left 2/1/2023    Procedure: Cardiac Left Heart Cath;  Surgeon: Logan Wilburn MD;  Location: [facility] C[facility]; Service: Cardiology   • CARDIAC CATHETERIZATION N/A 2/1/2023    Procedure: Cardiac pci;  Surgeon: Logan Wilburn MD;  Location: [facility]; Service: Cardiology   • CARDIAC CATHETERIZATION N/A 2/1/2023    Procedure: Cardiac Coronary Angiogram;  Surgeon: Logan Wilburn MD;  Location: [facility]; Service: Cardiology     Social History     Substance and Sexual Activity   Alcohol Use Not Currently     Social History     Substance and Sexual Activity   Drug Use Not Currently     Social History     Tobacco Use   Smoking Status Former   • Types: Cigarettes   Smokeless Tobacco Never     Family History: History reviewed  No pertinent family history      Meds/Allergies   Hospital Medications:   Current Facility-Administered Medications   Medication Dose Route Frequency   • acetaminophen (TYLENOL) tablet 650 mg  650 mg Oral Q6H PRN   • amiodarone (CORDARONE) 900 mg in dextrose 5 % 500 mL infusion  1 mg/min Intravenous Continuous   • aspirin chewable tablet 81 mg  81 mg Oral Daily   • atorvastatin (LIPITOR) tablet 80 mg  80 mg Oral Daily With Dinner   • carvedilol (COREG) tablet 6 25 mg  6 25 mg Oral BID With Meals   • ceftriaxone (ROCEPHIN) 1 g/50 mL in dextrose IVPB  1,000 mg Intravenous Q24H   • chlorhexidine (PERIDEX) 0 12 % oral rinse 15 mL  15 mL Mouth/Throat Q12H [address]   • [START ON 2/3/2023] clopidogrel (PLAVIX) tablet 75 mg  75 mg Oral Daily   • Diclofenac Sodium (VOLTAREN) 1 % topical gel 2 g  2 g Topical 4x Daily   • heparin (porcine) subcutaneous injection 5,000 Units  5,000 Units Subcutaneous Q8H [address]   • HYDROmorphone (DILAUDID) injection 0 5 mg  0 5 mg Intravenous Q4H PRN   • insulin lispro (HumaLOG) 100 units/mL subcutaneous injection 1-6 Units  1-6 Units Subcutaneous HS   • insulin lispro (HumaLOG) 100 units/mL subcutaneous injection 1-6 Units  1-6 Units Subcutaneous 4 times day   • nitroglycerin (NITROSTAT) SL tablet 0 4 mg  0 4 mg Sublingual Q5 Min PRN   • potassium phosphates 30 mmol in sodium chloride 0 9 % 250 mL infusion  30 mmol Intravenous Once   • sacubitril-valsartan (ENTRESTO) 24-26 MG per tablet 1 tablet  1 tablet Oral BID   • trimethobenzamide (TIGAN) IM injection 200 mg  200 mg Intramuscular Q6H PRN     Home Medications:   Medications Prior to Admission   Medication   • ARIPiprazole (ABILIFY) 10 mg tablet   • buPROPion (WELLBUTRIN XL) 300 mg 24 hr tablet   • sertraline (ZOLOFT) 100 mg tablet   • buPROPion (WELLBUTRIN XL) 150 mg 24 hr tablet       No Known Allergies    Objective   Vitals: Blood pressure 137/76, pulse (!) 53, temperature (!) 97 °F (36 1 °C), temperature source Oral, resp  rate 17, height 6' (1 829 m), weight 77 1 kg (169 lb 15 6 oz), SpO2 95 %  Orthostatic Blood Pressures    Flowsheet Row Most Recent Value   Blood Pressure 137/76 filed at 02/02/2023 7087   Patient Position - Orthostatic VS Lying filed at 02/02/2023 0601            Intake/Output Summary (Last 24 hours) at 2/2/2023 0959  Last data filed at 2/2/2023 0943  Gross per 24 hour   Intake 1457 31 ml   Output 690 ml   Net 767 31 ml       Invasive Devices     Peripheral Intravenous Line  Duration           Peripheral IV 01/31/23 Dorsal (posterior); Right Forearm 2 days    Peripheral IV 01/31/23 Left Forearm 1 day                Physical Exam  GEN: Rachael Chaudhary   appears well, alert and oriented x 3, pleasant and cooperative   HEENT:  Normocephalic, atraumatic, anicteric, moist mucous membranes  NECK: No JVD or carotid bruits   HEART: sinus rhythm, janes rate, normal S1 and S2, no murmurs, clicks, gallops or rubs   LUNGS: Clear to auscultation bilaterally; no wheezes, rales, or rhonchi; respiration nonlabored   ABDOMEN:  Normoactive bowel sounds, soft, no tenderness, no distention  EXTREMITIES: peripheral pulses palpable; no edema  Chest TTP due to rib fractures  NEURO: no gross focal findings; cranial nerves grossly intact   SKIN:  Dry, intact, warm to touch    Lab Results: I have personally reviewed pertinent lab results  Results from last 7 days   Lab Units 02/01/23  0204 01/31/23  2322 01/31/23  1921   HS TNI 0HR ng/L  --  354*  --    HS TNI 2HR ng/L 300*  --   --    HS TNI 4HR ng/L  --   --  340*         Results from last 7 days   Lab Units 02/02/23  0528 02/01/23  0546 01/31/23  0858   POTASSIUM mmol/L 3 4* 3 8 4 3   CO2 mmol/L 26 29 27   CHLORIDE mmol/L 103 103 105   BUN mg/dL 20 17 13   CREATININE mg/dL 0 63 0 68 1 04     Results from last 7 days   Lab Units 02/02/23  0528 02/01/23  0546 01/31/23  1707   HEMOGLOBIN g/dL 11 3* 11 8* 13 6   HEMATOCRIT % 35 2* 36 4* 41 9   PLATELETS Thousands/uL 127* 130* 147*     Results from last 7 days   Lab Units 02/01/23  0546   TRIGLYCERIDES mg/dL 70   HDL mg/dL 96   LDL CALC mg/dL 87   LDL DIRECT mg/dl 90   HEMOGLOBIN A1C % 5 4     Results from last 7 days   Lab Units 02/01/23  0830 01/31/23  2322 01/31/23  1707   INR   --   --  0 98   PTT seconds 66* 43* 24       Imaging: I have personally reviewed pertinent reports  Previous STRESS TEST:  No results found for this or any previous visit  No results found for this or any previous visit  No results found for this or any previous visit  Previous Cath/PCI:  No results found for this or any previous visit  No results found for this or any previous visit  No results found for this or any previous visit  ECHO:  No results found for this or any previous visit      Results for orders placed during the hospital encounter of 01/31/23    Echo complete w/ contrast if indicated    Interpretation Summary  •  Left Ventricle: Left ventricular cavity size is normal  Wall thickness is normal  The left ventricular ejection fraction is 25%  Systolic function is severely reduced  There is moderate global hypokinesis  Diastolic function is mildly abnormal, consistent with grade I (abnormal) relaxation  •  Right Ventricle: Right ventricular cavity size is normal  Systolic function is normal       JOSEPH:  No results found for this or any previous visit  No results found for this or any previous visit  CMR:  No results found for this or any previous visit  No results found for this or any previous visit  No results found for this or any previous visit  HOLTER  No results found for this or any previous visit  No results found for this or any previous visit  Anticoagulation VTE Prophylaxis: Heparin     Counseling / Coordination of Care  Total floor / unit time spent today 45 minutes minutes  Greater than 50% of total time was spent with the patient and / or family counseling and / or coordination of care  A description of the counseling / coordination of care:         [facility]/ Allscripts/Care Everywhere records reviewed:     ** Please Note: Fluency DirectDictation voice to text software may have been used in the creation of this document   **

## 2023-02-02 NOTE — PROGRESS NOTES
INTERNAL MEDICINE RESIDENCY PROGRESS NOTE     Name: Lili Patel  Age & Sex: 71 y o  male   MRN: [id number]  Unit/Bed#: -01   Encounter: [id number]  Team: SOD C    PATIENT INFORMATION     Name: Lili Patel  Age & Sex: 71 y o  male   MRN: [id number]  Hospital Stay Days: 2    ASSESSMENT/PLAN     Principal Problem:    Cardiac arrest [facility])  Active Problems:    Wide-complex tachycardia    CHF (congestive heart failure) ([facility] [address] )    Acute hypoxemic respiratory failure (HCC)    Multiple fractures of ribs, bilateral, initial encounter for closed fracture    Foreign body in stomach    Elevated troponin    Substance use disorder    Mood disorder (HCC)    Pneumothorax    Thrombocytopenia (HCC)    Leukocytosis    Epistaxis    Pneumonia    Psoriasis    Coronary artery disease      * Cardiac arrest [facility])  Assessment & Plan  Presented after cardiac arrest at home, ROSC in the field without defibrillation or medications  1/3 rhythm strips from EMS showed wide complex tachycardia, other sinus rhythm  ? Unclear what rhythm patient was in during arrest    Assessment:  ? ECG on admission showing inferior Q waves and new T wave inversions in the anteroseptal leads on repeat  ? TTE on admission shows LVEF 25%, severely reduced systolic function, P6AG, moderate global hypokinesis  ? Etiology may be primary arrhythmia vs ACS vs hypoxia related to pneumonia vs substance use  Plan:  ? Cardiology following, appreciate recommendations  ? Underwent cath 02/01 that demonstrated 99% Prox RCA, 60% Mid RCA, and 60% Mid Cx  S/P angioplasty and DAVID x1   ? ASA 81 daily, high intensity statin, Plavix  ? Start Coreg 6 25 mg bid - consider increasing to 12 5mg within appropriate BP parameters  ? Amiodarone IV transitioned to PO 2/2 as noted under wide complex tachycardia plan    Wide-complex tachycardia  Assessment & Plan  Present during transport (see media for rhythm strips from EMS)  ? Continue telemetry  ?  Cardiology consulted, appreciate recommendations  ? EP consulted, appreciate recommendations  ? Amiodarone infusion transitioned to PO on 2/2  Pt is to be on amiodarone 400 mg x 7 days followed by 200 mg daily thereafter  ? Patient would benefit from optimized medical therapy and LifeVest, plan for ICD placement if EF remains low on repeat echo in 3 months    CHF (congestive heart failure) ([facility] [address] )  Assessment & Plan  Wt Readings from Last 3 Encounters:   02/01/23 82 kg (180 lb 12 4 oz)     Patient with new reduced EF 25% on TTE on admission in setting of recent cardiac arrest and signs of ischemia on ECG  · No history per patient and chart review  · Cardiology following, appreciate recommendations  · S/P Catheterization (see report above)  · GDMT as able  - Continue Coreg 6 25 mg bid - consider increasing if to 12 5 mg if BP and HR permits  Pt continues to be bradycardic, asx    - Initiated Entresto 24/26 mg twice daily on 2/2  - No evidence of acute exacerbation, no need for diuretic at this time  - No indication for aldosterone antagonism at this time  - Consider SGLT2i     Acute hypoxemic respiratory failure [facility])  Assessment & Plan  Patient intubated in the field during cardiac arrest resuscitation, extubated upon arrival to ED  Now saturating 100% 12 L MFNC  Improved mentation and oxygen saturations  · Patient now saturating appropriately on room air  · Incentive spirometry    Multiple fractures of ribs, bilateral, initial encounter for closed fracture  Assessment & Plan  Present on CTA PE on admission, anterior rib fractures 2-6 bilaterally 2/2 CPR during cardiac arrest  · Maintain adequate pain control to promote proper respiration, currently prn Dilaudid 0 5 mg q4hrs  · Incentive spirometry as tolerated  ·  topical voltaren  · Lidocaine patches    Foreign body in stomach  Assessment & Plan  CTA PE on admission shows metallic material in the lower esophagus; likely a tooth in setting of patient report of loss of a tooth   CXR and KUB show object in stomach 02/01  · Lower concern for ingestion of ilicit substance that may have precipitated cardiac arrest  · Tooth cap in colon on repeat KUB    Coronary artery disease  Assessment & Plan  S/P cardiac cath performed on 2/1/2023: 99% Prox RCA, 60% Mid RCA, and 60% Mid Cx  S/P angioplasty and DAVID x1    · ASA 81 mg and Plavix 75 mg, high intensity statin  · Cardiology consulted and appreciate recommendations      Psoriasis  Assessment & Plan  On Humira (Adalimumab) outpatient  · Hold Humira inpatient  · Add medium or high intensity topical steroid as needed such as triamcinolone or clobetasol    Pneumonia  Assessment & Plan  Present on CTA PE on admission, likely 2/2 to aspiration during cardiac arrest  · Follow up blood cultures from admission w/ NGTD  · Rocephin 1 g q24hr, 5 day course - Day 3/5  · Follow up procal in AM  · Can broaden antibiotics if clinically indicated  · Trend fever/WBC    Recent Labs     01/31/23  1758 02/01/23  0546   PROCALCITONI 0 22 0 33*       Epistaxis  Assessment & Plan  Reported on 02/01 am, left sided  Intermittent, responded to packing and pressure  · In setting of admitted history of cocaine use and history of nosebleeds  · If continue packing and pressure, saline flush  Consider Afrin  · Discontinued heparin following cath  Leukocytosis  Assessment & Plan  In setting of cardiac arrest, aspiration and pneumonia  · Improving  · Continue to monitor    Recent Labs     01/31/23  0858 01/31/23  1707 02/01/23  0546 02/02/23  0528   WBC 11 81* 12 12* 11 87* 10 21*       Thrombocytopenia (HCC)  Assessment & Plan  189 on admission  · No reports of epistaxis or other bleeding today  · Continue to monitor CBC    Recent Labs     01/31/23  0858 01/31/23  1707 02/01/23  0546 02/02/23  0528    147* 130* 127*       Pneumothorax  Assessment & Plan  Improved - not appreciable on follow up CXR  S/p cardiac arrest with CPR with broken ribs   CTA PE on admission shows trace anterior right pneumothorax subjacent to a rib facture  · Patient in no acute distress and hemodynamically stable on RA  · Stat CXR if patient suddenly requires increased O2 or become hemodynamically unstable  Mood disorder (HCC)  Assessment & Plan  Home medications Abilify 10 mg, Zoloft 100 mg, Wellbutrin 300 mg  Restarted 2/2    Substance use disorder  Assessment & Plan  Presented to the ED post cardiac arrest, UDS positive for cocaine and THC  Patient admits to cocaine use, reported for back pain, last use 2-3 days ago  ·  on discontinuation of substance use in setting of cardiac arrest, possible ACS and CHF  · Discuss alternative pain control options for back pain  · CRS consult    Elevated troponin  Assessment & Plan  S/p cardiac arrest - concern for NSTEMI  · ECG 01/31 showed ST depressions in lateral limb leads and anteroseptal leads in setting of chest pain  · Elevated trops to 350s  · Catheterization as noted above  · Cardiology following, appreciate recommendations    Metabolic encephalopathy-resolved as of 2/1/2023  Assessment & Plan  Improved  Patient was somnolent but arousable on admission, frequently falling asleep during conversations  · S/p cardiac arrest with amnesia to the event  · Did no respond to Narcan in the field  · Acidotic on arrival with improving VBG one supplemental oxygen  · UDS positive for THC and cocaine on admission  · Monitor mental status      Disposition: inpatient pending optimization of medical therapy and lifevest     SUBJECTIVE     Patient seen and examined  No acute events overnight  He states he had difficulty sleeping secondary to significant sharp central chest pain  Breathing and coughing exacerbate pain  He is looking forward to trying to eat breakfast this morning  He denies any nausea, fevers, palpitations, constipation or any other concerns       OBJECTIVE     Vitals:    02/02/23 0225 02/02/23 0528 02/02/23 0812 02/02/23 1121   BP: 136/60  137/76 97/54 BP Location:   Right arm    Pulse: (!) 54  (!) 53 (!) 48   Resp:   17 20   Temp: 97 6 °F (36 4 °C)  (!) 97 °F (36 1 °C) 97 8 °F (36 6 °C)   TempSrc:   Oral    SpO2: 93%  95% 93%   Weight:  77 1 kg (169 lb 15 6 oz)     Height:          Temperature:   Temp (24hrs), Av 7 °F (36 5 °C), Min:97 °F (36 1 °C), Max:98 °F (36 7 °C)    Temperature: 97 8 °F (36 6 °C)  Intake & Output:  I/O        0701   0700  0701   0700  0701   0700    P  O  0 420     I V  (mL/kg) 1717 6 (20 9) 869 3 (11 3)     IV Piggyback 50 50     Total Intake(mL/kg) 1767 6 (21 6) 1339 3 (17 4)     Urine (mL/kg/hr) 1150 690 (0 4)     Total Output 1150 690     Net +617 6 +649 3                Weights:   IBW (Ideal Body Weight): 77 6 kg    Body mass index is 23 05 kg/m²  Weight (last 2 days)     Date/Time Weight    23 0528 77 1 (169 97)    23 0600 82 (180 78)    23 2100 83 (182 9)    23 1511 80 3 (177)    23 1430 80 3 (177)        Physical Exam  Vitals and nursing note reviewed  Constitutional:       General: He is not in acute distress  Appearance: He is well-developed  HENT:      Head: Normocephalic and atraumatic  Nose: Nose normal       Mouth/Throat:      Mouth: Mucous membranes are moist       Comments: missing several teeth  Eyes:      Conjunctiva/sclera: Conjunctivae normal    Cardiovascular:      Rate and Rhythm: Regular rhythm  Bradycardia present  Heart sounds: No murmur heard  Pulmonary:      Effort: Pulmonary effort is normal  No respiratory distress  Breath sounds: Normal breath sounds  Comments: Decreased chest wall movement, shallow breathing  Chest:      Chest wall: Tenderness present  Abdominal:      Palpations: Abdomen is soft  Tenderness: There is no abdominal tenderness  Musculoskeletal:         General: No swelling  Cervical back: Neck supple  Right lower leg: No edema  Left lower leg: No edema     Skin:     General: Skin is warm and dry  Capillary Refill: Capillary refill takes less than 2 seconds  Neurological:      Mental Status: He is alert  Psychiatric:         Mood and Affect: Mood normal        LABORATORY DATA     Labs: I have personally reviewed pertinent reports  Results from last 7 days   Lab Units 02/02/23  0528 02/01/23  0546 01/31/23  1707 01/31/23  0858   WBC Thousand/uL 10 21* 11 87* 12 12* 11 81*   HEMOGLOBIN g/dL 11 3* 11 8* 13 6 15 3   HEMATOCRIT % 35 2* 36 4* 41 9 48 8   PLATELETS Thousands/uL 127* 130* 147* 189   NEUTROS PCT % 83* 84*  --  46   MONOS PCT % 9 7  --  7      Results from last 7 days   Lab Units 02/02/23  0528 02/01/23  0546 01/31/23  0858   POTASSIUM mmol/L 3 4* 3 8 4 3   CHLORIDE mmol/L 103 103 105   CO2 mmol/L 26 29 27   BUN mg/dL 20 17 13   CREATININE mg/dL 0 63 0 68 1 04   CALCIUM mg/dL 8 5 8 4 8 8   ALK PHOS U/L 62 66 103   ALT U/L 37 41 37   AST U/L 53* 46* 39     Results from last 7 days   Lab Units 02/02/23  0528 02/01/23  0546   MAGNESIUM mg/dL 2 2 2 3     Results from last 7 days   Lab Units 02/02/23  0528 02/01/23  0546   PHOSPHORUS mg/dL 1 9* 3 1      Results from last 7 days   Lab Units 02/01/23  0830 01/31/23  2322 01/31/23  1707   INR   --   --  0 98   PTT seconds 66* 43* 24     Results from last 7 days   Lab Units 01/31/23  1159   LACTIC ACID mmol/L 1 0           IMAGING & DIAGNOSTIC TESTING     Radiology Results: I have personally reviewed pertinent reports  XR chest portable    Result Date: 2/1/2023  Impression: Right midlung opacity/consolidation, decreased from the previous study Left lower lung opacity may be due to atelectasis or infiltrate Workstation performed: [id number]     XR abdomen 1 view kub    Result Date: 2/1/2023  Impression: Opaque foreign body in the left upper quadrant of the abdomen is unchanged in position as of the latest study on 2/1/2023 at 4:54 AM  This could represent a tooth fragment and should be correlated with physical exam the oral cavity  Workstation performed: [id number]     XR abdomen 1 view kub    Result Date: 2/1/2023  Impression: Opaque foreign body in the left upper quadrant of the abdomen is unchanged in position as of the latest study on 2/1/2023 at 4:54 AM  This could represent a tooth fragment and should be correlated with physical exam the oral cavity  Workstation performed: [id number]     CT head without contrast    Result Date: 1/31/2023  Impression: No evidence of acute intracranial process  Chronic microangiopathy  Workstation performed: [id number]     XR chest portable ICU    Result Date: 2/1/2023  Impression: Foreign body appears to be in the left upper quadrant of the abdomen  Hazy density probably represents effusion  Left lower lobe atelectasis versus infiltrate  Workstation performed: [id number]     CTA ED chest PE Study    Result Date: 1/31/2023  Impression: 1  No pulmonary embolism  2   Bilateral anterior 2nd- 6th rib fractures  Trace anterior right pneumothorax subjacent to a rib fracture  3   Right upper lobe pneumonia, likely due to aspiration  4   Interlobular septal thickening likely represents pulmonary edema  Groundglass opacity in the medial right upper lobe may be related to edema, infection or atelectasis  5   Significant bilateral posterior lower lobe segmental atelectasis  6   Metallic material in the lower esophagus  Finding may represent a foreign body  Correlation with prior procedural history is also advised  I personally discussed this study with Gorge Miramontes on 1/31/2023 at 11:20 AM  Workstation performed: [id number]     Other Diagnostic Testing: I have personally reviewed pertinent reports      ACTIVE MEDICATIONS     Current Facility-Administered Medications   Medication Dose Route Frequency   • acetaminophen (TYLENOL) tablet 650 mg  650 mg Oral Q6H PRN   • amiodarone tablet 400 mg  400 mg Oral TID With Meals   • ARIPiprazole (ABILIFY) tablet 10 mg  10 mg Oral Daily   • aspirin chewable tablet 81 mg 81 mg Oral Daily   • atorvastatin (LIPITOR) tablet 80 mg  80 mg Oral Daily With Dinner   • buPROPion (WELLBUTRIN XL) 24 hr tablet 300 mg  300 mg Oral Daily   • carvedilol (COREG) tablet 6 25 mg  6 25 mg Oral BID With Meals   • ceftriaxone (ROCEPHIN) 1 g/50 mL in dextrose IVPB  1,000 mg Intravenous Q24H   • [START ON 2/3/2023] clopidogrel (PLAVIX) tablet 75 mg  75 mg Oral Daily   • Diclofenac Sodium (VOLTAREN) 1 % topical gel 2 g  2 g Topical 4x Daily   • heparin (porcine) subcutaneous injection 5,000 Units  5,000 Units Subcutaneous Q8H [address]   • HYDROmorphone (DILAUDID) injection 0 5 mg  0 5 mg Intravenous Q4H PRN   • insulin lispro (HumaLOG) 100 units/mL subcutaneous injection 1-6 Units  1-6 Units Subcutaneous HS   • insulin lispro (HumaLOG) 100 units/mL subcutaneous injection 1-6 Units  1-6 Units Subcutaneous 4 times day   • lidocaine (LIDODERM) 5 % patch 1 patch  1 patch Topical Daily   • nitroglycerin (NITROSTAT) SL tablet 0 4 mg  0 4 mg Sublingual Q5 Min PRN   • potassium phosphates 30 mmol in sodium chloride 0 9 % 250 mL infusion  30 mmol Intravenous Once   • sacubitril-valsartan (ENTRESTO) 24-26 MG per tablet 1 tablet  1 tablet Oral BID   • sertraline (ZOLOFT) tablet 100 mg  100 mg Oral Daily   • trimethobenzamide (TIGAN) IM injection 200 mg  200 mg Intramuscular Q6H PRN       VTE Pharmacologic Prophylaxis: Heparin  VTE Mechanical Prophylaxis: sequential compression device    Portions of the record may have been created with voice recognition software  Occasional wrong word or "sound a like" substitutions may have occurred due to the inherent limitations of voice recognition software    Read the chart carefully and recognize, using context, where substitutions have occurred   ==  Alexander Hernandez MD  [facility]  Internal Medicine Residency PGY-1

## 2023-02-02 NOTE — QUICK NOTE
Pt's wife, Bessy Fairchild at beside  Updated her about ongoing treatment plan and any patient updates  All questions answered  Kain Cobos MD  Internal Medicine Residency PGY-1  [facility]  Available on [email or web address]  [email or web address]

## 2023-02-02 NOTE — CERTIFIED RECOVERY SPECIALIST
Certified  Note    Patient name: Presley Ding  Location: /-01  [place]: [address] [place] [address]  Attending: Ehsan Jama MD MRN [id number]  : 1950  Age: 71 y o  Sex: male Date 2023         Substance Use History:     Social History     Substance and Sexual Activity   Alcohol Use Not Currently        Social History     Substance and Sexual Activity   Drug Use Not Currently     CRS received consult to meet with patient  Patient and wife were in conversation with staff at time of arrival   Gwendolyn Winters will continue to follow      Ned Beck

## 2023-02-03 PROBLEM — D72.829 LEUKOCYTOSIS: Status: RESOLVED | Noted: 2023-02-01 | Resolved: 2023-02-03

## 2023-02-03 LAB
ANION GAP SERPL CALCULATED.3IONS-SCNC: 8 MMOL/L (ref 4–13)
BASOPHILS # BLD AUTO: 0.04 THOUSANDS/ÂΜL (ref 0–0.1)
BASOPHILS NFR BLD AUTO: 1 % (ref 0–1)
BUN SERPL-MCNC: 17 MG/DL (ref 5–25)
CALCIUM SERPL-MCNC: 8.5 MG/DL (ref 8.3–10.1)
CHLORIDE SERPL-SCNC: 107 MMOL/L (ref 96–108)
CO2 SERPL-SCNC: 24 MMOL/L (ref 21–32)
CREAT SERPL-MCNC: 0.66 MG/DL (ref 0.6–1.3)
EOSINOPHIL # BLD AUTO: 0.16 THOUSAND/ÂΜL (ref 0–0.61)
EOSINOPHIL NFR BLD AUTO: 2 % (ref 0–6)
ERYTHROCYTE [DISTWIDTH] IN BLOOD BY AUTOMATED COUNT: 13.8 % (ref 11.6–15.1)
GFR SERPL CREATININE-BSD FRML MDRD: 97 ML/MIN/1.73SQ M
GLUCOSE SERPL-MCNC: 100 MG/DL (ref 65–140)
GLUCOSE SERPL-MCNC: 119 MG/DL (ref 65–140)
GLUCOSE SERPL-MCNC: 122 MG/DL (ref 65–140)
GLUCOSE SERPL-MCNC: 127 MG/DL (ref 65–140)
GLUCOSE SERPL-MCNC: 164 MG/DL (ref 65–140)
GLUCOSE SERPL-MCNC: 164 MG/DL (ref 65–140)
HCT VFR BLD AUTO: 34.8 % (ref 36.5–49.3)
HGB BLD-MCNC: 11.7 G/DL (ref 12–17)
IMM GRANULOCYTES # BLD AUTO: 0.04 THOUSAND/UL (ref 0–0.2)
IMM GRANULOCYTES NFR BLD AUTO: 1 % (ref 0–2)
LYMPHOCYTES # BLD AUTO: 1.32 THOUSANDS/ÂΜL (ref 0.6–4.47)
LYMPHOCYTES NFR BLD AUTO: 17 % (ref 14–44)
MCH RBC QN AUTO: 31.1 PG (ref 26.8–34.3)
MCHC RBC AUTO-ENTMCNC: 33.6 G/DL (ref 31.4–37.4)
MCV RBC AUTO: 93 FL (ref 82–98)
MONOCYTES # BLD AUTO: 0.72 THOUSAND/ÂΜL (ref 0.17–1.22)
MONOCYTES NFR BLD AUTO: 9 % (ref 4–12)
NEUTROPHILS # BLD AUTO: 5.7 THOUSANDS/ÂΜL (ref 1.85–7.62)
NEUTS SEG NFR BLD AUTO: 70 % (ref 43–75)
NRBC BLD AUTO-RTO: 0 /100 WBCS
PHOSPHATE SERPL-MCNC: 2.7 MG/DL (ref 2.3–4.1)
PLATELET # BLD AUTO: 142 THOUSANDS/UL (ref 149–390)
PMV BLD AUTO: 11.7 FL (ref 8.9–12.7)
POTASSIUM SERPL-SCNC: 3.6 MMOL/L (ref 3.5–5.3)
PROCALCITONIN SERPL-MCNC: 0.16 NG/ML
RBC # BLD AUTO: 3.76 MILLION/UL (ref 3.88–5.62)
SODIUM SERPL-SCNC: 139 MMOL/L (ref 135–147)
WBC # BLD AUTO: 7.98 THOUSAND/UL (ref 4.31–10.16)

## 2023-02-03 RX ORDER — HYDROMORPHONE HCL/PF 1 MG/ML
0.5 SYRINGE (ML) INJECTION EVERY 4 HOURS PRN
Status: DISCONTINUED | OUTPATIENT
Start: 2023-02-03 | End: 2023-02-04 | Stop reason: HOSPADM

## 2023-02-03 RX ORDER — OXYCODONE HYDROCHLORIDE 5 MG/1
5 TABLET ORAL EVERY 4 HOURS PRN
Status: DISCONTINUED | OUTPATIENT
Start: 2023-02-03 | End: 2023-02-04 | Stop reason: HOSPADM

## 2023-02-03 RX ORDER — POTASSIUM CHLORIDE 20 MEQ/1
40 TABLET, EXTENDED RELEASE ORAL 2 TIMES DAILY
Status: COMPLETED | OUTPATIENT
Start: 2023-02-03 | End: 2023-02-03

## 2023-02-03 RX ORDER — ACETAMINOPHEN 325 MG/1
975 TABLET ORAL EVERY 8 HOURS SCHEDULED
Status: DISCONTINUED | OUTPATIENT
Start: 2023-02-03 | End: 2023-02-04 | Stop reason: HOSPADM

## 2023-02-03 RX ORDER — INSULIN LISPRO 100 [IU]/ML
1-6 INJECTION, SOLUTION INTRAVENOUS; SUBCUTANEOUS
Status: DISCONTINUED | OUTPATIENT
Start: 2023-02-03 | End: 2023-02-04 | Stop reason: HOSPADM

## 2023-02-03 RX ORDER — OXYCODONE HYDROCHLORIDE 10 MG/1
10 TABLET ORAL EVERY 4 HOURS PRN
Status: DISCONTINUED | OUTPATIENT
Start: 2023-02-03 | End: 2023-02-04 | Stop reason: HOSPADM

## 2023-02-03 RX ADMIN — SACUBITRIL AND VALSARTAN 1 TABLET: 24; 26 TABLET, FILM COATED ORAL at 08:38

## 2023-02-03 RX ADMIN — DICLOFENAC SODIUM 2 G: 10 GEL TOPICAL at 17:46

## 2023-02-03 RX ADMIN — CARVEDILOL 6.25 MG: 6.25 TABLET, FILM COATED ORAL at 17:43

## 2023-02-03 RX ADMIN — AMIODARONE HYDROCHLORIDE 400 MG: 200 TABLET ORAL at 08:34

## 2023-02-03 RX ADMIN — OXYCODONE HYDROCHLORIDE 10 MG: 10 TABLET ORAL at 21:47

## 2023-02-03 RX ADMIN — OXYCODONE HYDROCHLORIDE 10 MG: 10 TABLET ORAL at 12:37

## 2023-02-03 RX ADMIN — SERTRALINE 100 MG: 100 TABLET, FILM COATED ORAL at 08:37

## 2023-02-03 RX ADMIN — HEPARIN SODIUM 5000 UNITS: 5000 INJECTION INTRAVENOUS; SUBCUTANEOUS at 05:12

## 2023-02-03 RX ADMIN — HYDROMORPHONE HYDROCHLORIDE 0.5 MG: 1 INJECTION, SOLUTION INTRAMUSCULAR; INTRAVENOUS; SUBCUTANEOUS at 08:40

## 2023-02-03 RX ADMIN — ARIPIPRAZOLE 10 MG: 10 TABLET ORAL at 08:38

## 2023-02-03 RX ADMIN — DICLOFENAC SODIUM 2 G: 10 GEL TOPICAL at 08:39

## 2023-02-03 RX ADMIN — INSULIN LISPRO 1 UNITS: 100 INJECTION, SOLUTION INTRAVENOUS; SUBCUTANEOUS at 21:39

## 2023-02-03 RX ADMIN — LIDOCAINE 1 PATCH: 50 PATCH CUTANEOUS at 08:39

## 2023-02-03 RX ADMIN — ACETAMINOPHEN 975 MG: 325 TABLET ORAL at 21:39

## 2023-02-03 RX ADMIN — ASPIRIN 81 MG CHEWABLE TABLET 81 MG: 81 TABLET CHEWABLE at 08:37

## 2023-02-03 RX ADMIN — HEPARIN SODIUM 5000 UNITS: 5000 INJECTION INTRAVENOUS; SUBCUTANEOUS at 13:55

## 2023-02-03 RX ADMIN — ACETAMINOPHEN 975 MG: 325 TABLET ORAL at 13:55

## 2023-02-03 RX ADMIN — BUPROPION HYDROCHLORIDE 300 MG: 150 TABLET, FILM COATED, EXTENDED RELEASE ORAL at 08:38

## 2023-02-03 RX ADMIN — POTASSIUM CHLORIDE 40 MEQ: 1500 TABLET, EXTENDED RELEASE ORAL at 08:37

## 2023-02-03 RX ADMIN — DICLOFENAC SODIUM 2 G: 10 GEL TOPICAL at 11:37

## 2023-02-03 RX ADMIN — ACETAMINOPHEN 650 MG: 325 TABLET ORAL at 01:15

## 2023-02-03 RX ADMIN — CLOPIDOGREL BISULFATE 75 MG: 75 TABLET ORAL at 08:34

## 2023-02-03 RX ADMIN — POTASSIUM CHLORIDE 40 MEQ: 1500 TABLET, EXTENDED RELEASE ORAL at 17:43

## 2023-02-03 RX ADMIN — ATORVASTATIN CALCIUM 80 MG: 80 TABLET, FILM COATED ORAL at 17:43

## 2023-02-03 RX ADMIN — CARVEDILOL 6.25 MG: 6.25 TABLET, FILM COATED ORAL at 08:37

## 2023-02-03 RX ADMIN — HEPARIN SODIUM 5000 UNITS: 5000 INJECTION INTRAVENOUS; SUBCUTANEOUS at 21:40

## 2023-02-03 RX ADMIN — SACUBITRIL AND VALSARTAN 1 TABLET: 24; 26 TABLET, FILM COATED ORAL at 17:46

## 2023-02-03 RX ADMIN — INSULIN LISPRO 1 UNITS: 100 INJECTION, SOLUTION INTRAVENOUS; SUBCUTANEOUS at 11:36

## 2023-02-03 RX ADMIN — HYDROMORPHONE HYDROCHLORIDE 0.5 MG: 1 INJECTION, SOLUTION INTRAMUSCULAR; INTRAVENOUS; SUBCUTANEOUS at 01:15

## 2023-02-03 NOTE — PROGRESS NOTES
Cardiology Team 2 Progress Note - Marita Decent  71 y o  male MRN: [id number]    Unit/Bed#: -72 Encounter: [id number]    Assessment/Plan:     23-year-old male with history of HTN, depression/anxiety and PTSD on Abilify and Wellbutrin who is admitted for out-of-hospital cardiac arrest with ROSC in setting of cocaine abuse, rhythm strips en route to ED showed wide-complex tachycardia; repeat ECG on admission showing inferior Q waves and T wave inversions in lateral and anteroseptal leads; TTE on admission showing LVEF 25%, G1 DD, moderate global hypokinesis; subsequent LHC showing multivessel CAD 99% stenosis of proximal RCA, nonobstructive stenosis of mid RCA and mid circumflex status post PCI to proximal RCA with single DAVID placement     Hospital Stay Days: 3     Assessment:  #Cardiac arrest; out-of-hospital; ROSC achieved by EMS PTA afer 5-7 minutes of CPR, without use of medications or defibrillation- etiology likely multifactorial inlcuding cocaine-induced vasospasm vs  other substance use vs  ischemic cardiomyopathy vs  scar-mediated re-entry monomorphic VT, given evidence of q waves on ecg  #CAD; S/P LHC 2/1 showing 99% stenosis of proximal RCA treated with DAVID placement to proximal RCA; nonobstructive lesions to mid circumflex and mid RCA also noted;  #Wide-complex ventricular tachycardia; as seen on rhythm strips while en route to ED; no recurrence during present hospital course  #Anterior T-wave inversions; on ECG on admission; ischemia likely 2/2 cocaine-induced vasospasm  #Acute toxic metabolic encephalopathy; resolved  #Cardiomyopathy; reduced EF 25% on TTE on admission in setting of cardiac arrest PTA; no known prior; likely ischemic in setting of LHC findings, PTA cocaine use   #Acute hypoxic hypercapnic respiratory failure; improved  #RUL PNA; on IV antibiotics  #Rib fractures; seen on imaging; likely 2/2 CPR performed in field   #R sided pneumothorax; small; in setting of rib fracture  #Cocaine positive, on RDS on admission  #THC positive, on RDS on admisison     Plan:   • Consider stopping amiodarone infusion given no recurrence of arrhythmia during current hospital course  • Continue GDMT including:  ? Coreg 6 25 mg twice daily; consider increasing to 12 5mg within appropriate BP parameters  ? Continue Entresto 4/26 mg  ? Acacian for diuretics as patient without S/S volume overload/decompensated HF in setting of severe ischemic cardiomyopathy  ? No indication for aldosterone antagonist at this time  • DAPT with lifelong aspirin 81 mg qd and Plavix 75 mg daily for 6 months   • Continue lipid-lowering tx with statin   • Strict I/O and Daily weights  • LifeVest at time of discharge given severely reduced EF 25% in setting of ischemic cardiomyopathy  • EP follow-up for potential ICD placement if EF remains low despite 3 to 4 months of GDMT  • Outpatient cardiology follow-up      Subjective:   Patient seen and examined  Feels much better, does still endorse chest wall pain attributable to rib fractures  Resting comfortably  Tolerating p o  Denies chest pressure or tightness, shortness of breath, palpitations, dizziness, orthopnea, leg swelling  No other concerns reported  Vitals: Blood pressure 109/59, pulse (!) 53, temperature 97 7 °F (36 5 °C), temperature source Oral, resp  rate 20, height 6' (1 829 m), weight 79 8 kg (176 lb), SpO2 91 % , Body mass index is 23 87 kg/m² ,   Orthostatic Blood Pressures    Flowsheet Row Most Recent Value   Blood Pressure 109/59 filed at 02/03/2023 1149   Patient Position - Orthostatic VS Sitting filed at 02/03/2023 1149            Intake/Output Summary (Last 24 hours) at 2/3/2023 1312  Last data filed at 2/3/2023 0900  Gross per 24 hour   Intake 120 ml   Output 850 ml   Net -730 ml       Physical Exam:    GEN: Consuelo Mckeon   appears well, alert and oriented x 3, pleasant and cooperative   HEENT:  Normocephalic, atraumatic, anicteric, moist mucous membranes  NECK: No JVD or carotid bruits   HEART: Regular rhythm, regular rate, normal S1 and S2, no murmurs, clicks, gallops or rubs   LUNGS: Clear to auscultation bilaterally; no wheezes, rales, or rhonchi; respiration nonlabored   ABDOMEN:  Normoactive bowel sounds, soft, no tenderness, no distention  EXTREMITIES: peripheral pulses palpable; no BLE edema  NEURO: no gross focal findings; cranial nerves grossly intact   SKIN:  Dry, intact, warm to touch      Current Facility-Administered Medications:   •  acetaminophen (TYLENOL) tablet 975 mg, 975 mg, Oral, Q8H [facility], Phoenix Scott MD  •  ARIPiprazole (ABILIFY) tablet 10 mg, 10 mg, Oral, Daily, Phoenix Scott MD, 10 mg at 02/03/23 3759  •  aspirin chewable tablet 81 mg, 81 mg, Oral, Daily, [facility], 81 mg at 02/03/23 2268  •  atorvastatin (LIPITOR) tablet 80 mg, 80 mg, Oral, Daily With Dinner, [facility], 80 mg at 02/02/23 1617  •  buPROPion (WELLBUTRIN XL) 24 hr tablet 300 mg, 300 mg, Oral, Daily, Phoenix Scott MD, 300 mg at 02/03/23 3280  •  carvedilol (COREG) tablet 6 25 mg, 6 25 mg, Oral, BID With Meals, [facility], 6 25 mg at 02/03/23 3183  •  clopidogrel (PLAVIX) tablet 75 mg, 75 mg, Oral, Daily, Cira Thorpe MD, 75 mg at 02/03/23 8905  •  Diclofenac Sodium (VOLTAREN) 1 % topical gel 2 g, 2 g, Topical, 4x Daily, Alvarez Arteaga MD, 2 g at 02/03/23 1137  •  heparin (porcine) subcutaneous injection 5,000 Units, 5,000 Units, Subcutaneous, Q8H [facility], [facility], , 5,000 Units at 02/03/23 0529  •  HYDROmorphone (DILAUDID) injection 0 5 mg, 0 5 mg, Intravenous, Q4H PRN, Phoenix Scott MD  •  insulin lispro (HumaLOG) 100 units/mL subcutaneous injection 1-6 Units, 1-6 Units, Subcutaneous, HS, Sue Powellp, DO  •  insulin lispro (HumaLOG) 100 units/mL subcutaneous injection 1-6 Units, 1-6 Units, Subcutaneous, 4 times day, 1 Units at 02/03/23 1136 **AND** Fingerstick Glucose (POCT), , , 4 times day, Tiajuana Bigger Loreto Kline MD  •  lidocaine (LIDODERM) 5 % patch 1 patch, 1 patch, Topical, Daily, Luisito Araya MD, 1 patch at 02/03/23 8792  •  nitroglycerin (NITROSTAT) SL tablet 0 4 mg, 0 4 mg, Sublingual, Q5 Min PRN, Rodger Mari MD  •  oxyCODONE (ROXICODONE) IR tablet 5 mg, 5 mg, Oral, Q4H PRN **OR** oxyCODONE (ROXICODONE) immediate release tablet 10 mg, 10 mg, Oral, Q4H PRN, Luisito Araya MD, 10 mg at 02/03/23 1237  •  potassium chloride (K-DUR,KLOR-CON) CR tablet 40 mEq, 40 mEq, Oral, BID, Luisito Araya MD, 40 mEq at 02/03/23 5913  •  sacubitril-valsartan (ENTRESTO) 24-26 MG per tablet 1 tablet, 1 tablet, Oral, BID, Rodger Mari MD, 1 tablet at 02/03/23 5505  •  sertraline (ZOLOFT) tablet 100 mg, 100 mg, Oral, Daily, Luisito Araya MD, 100 mg at 02/03/23 8877  •  trimethobenzamide (TIGAN) IM injection 200 mg, 200 mg, Intramuscular, Q6H PRN, Sue Jefferson DO, 200 mg at 02/01/23 1808    Labs & Results:          Results from last 7 days   Lab Units 02/03/23  0508 02/02/23  0528 02/01/23  0546   POTASSIUM mmol/L 3 6 3 4* 3 8   CO2 mmol/L 24 26 29   CHLORIDE mmol/L 107 103 103   BUN mg/dL 17 20 17   CREATININE mg/dL 0 66 0 63 0 68     Results from last 7 days   Lab Units 02/03/23  0508 02/02/23  0528 02/01/23  0546   HEMOGLOBIN g/dL 11 7* 11 3* 11 8*   HEMATOCRIT % 34 8* 35 2* 36 4*   PLATELETS Thousands/uL 142* 127* 130*     Results from last 7 days   Lab Units 02/01/23  0546   TRIGLYCERIDES mg/dL 70   HDL mg/dL 96   LDL CALC mg/dL 87   HEMOGLOBIN A1C % 5 4       Telemetry:   Personally reviewed by Flaherty Savers, DO: NSR, ST depressions in lateral/anteroseptal leads    Imaging:   XR abdomen 1 view kub   Final Result by Hakan Manuel MD (11/57 7773)      Previous radiopaque foreign body in the left upper quadrant is now seen in the right lower quadrant possibly in the cecal region  Stool in the colon                 Workstation performed: [id number]         XR abdomen 1 view kub   Final Result by Deanna Hare MD (02/01 1046)      Opaque foreign body in the left upper quadrant of the abdomen is unchanged in position as of the latest study on 2/1/2023 at 4:54 AM       This could represent a tooth fragment and should be correlated with physical exam the oral cavity  Workstation performed: [id number]         XR chest portable   Final Result by Caitlin Davidson MD (02/01 1507)      Right midlung opacity/consolidation, decreased from the previous study      Left lower lung opacity may be due to atelectasis or infiltrate            Workstation performed: [id number]         XR chest portable ICU   Final Result by Deanna Hare MD (02/01 1040)      Foreign body appears to be in the left upper quadrant of the abdomen  Hazy density probably represents effusion  Left lower lobe atelectasis versus infiltrate  Workstation performed: [id number]         XR abdomen 1 view kub   Final Result by Deanna Hare MD (02/01 1046)      Opaque foreign body in the left upper quadrant of the abdomen is unchanged in position as of the latest study on 2/1/2023 at 4:54 AM       This could represent a tooth fragment and should be correlated with physical exam the oral cavity  Workstation performed: [id number]         CT head without contrast   Final Result by Joselyn Álvarez MD (01/31 1108)      No evidence of acute intracranial process  Chronic microangiopathy  Workstation performed: [id number]         CTA ED chest PE Study   Final Result by Rashmi Hdz MD (01/31 1121)      1  No pulmonary embolism  2   Bilateral anterior 2nd- 6th rib fractures  Trace anterior right pneumothorax subjacent to a rib fracture  3   Right upper lobe pneumonia, likely due to aspiration  4   Interlobular septal thickening likely represents pulmonary edema    Groundglass opacity in the medial right upper lobe may be related to edema, infection or atelectasis  5   Significant bilateral posterior lower lobe segmental atelectasis  6   Metallic material in the lower esophagus  Finding may represent a foreign body  Correlation with prior procedural history is also advised  I personally discussed this study with Harvey Scott on 1/31/2023 at 11:20 AM                               Workstation performed: [id number]                 VTE Prophylaxis: Heparin, SCD       Pamela Pulido, DO  PGY-1  8 [facility]/ All[employer]/Care Everywhere records reviewed:    ** Please Note: Fluency DirectDictation voice to text software may have been used in the creation of this document   **

## 2023-02-03 NOTE — PLAN OF CARE
Problem: PHYSICAL THERAPY ADULT  Goal: Performs mobility at highest level of function for planned discharge setting  See evaluation for individualized goals  Description: Treatment/Interventions: Functional transfer training, LE strengthening/ROM, Therapeutic exercise, Endurance training, Patient/family training, Equipment eval/education, Gait training, Bed mobility, Elevations          See flowsheet documentation for full assessment, interventions and recommendations  Note: Prognosis: Good  Problem List: Decreased strength, Decreased endurance, Decreased coordination, Impaired balance, Decreased mobility, Pain  Assessment: Pt seen for physical therapy evaluation  Pt is a 72 y/o male w/ history/comorbidities of HTN, [place] palsy, MDD, PTSD who is now admitted after found down unresponsive at home for no more than 15 min  CPR in the field, intubated on admit  Dx include acute toxic metabolic encephalopathy, cardiac arrest/ v-tach, and UDS + for THC and cocaine  Underwent cath 2/1- likely needs lifevest   Due to acute medical issues, pain, fall risk, note unstable clinical picture  PT consulted to assess mobility, d/c needs  Pt presents w/ decreased functional mob, standing balance, endurance, B LE strength, barriers at home  Pt will benefit from skilled PT to correct for the above problems  anticipate d/c home w/ s/o when stable, doubt he will have therapy needs  PT Discharge Recommendation: No rehabilitation needs    See flowsheet documentation for full assessment

## 2023-02-03 NOTE — CASE MANAGEMENT
Case Management Progress Note    Patient name Lili Rank  Location /-01 MRN [id number]  : 1950 Date 2/3/2023       LOS (days): 3  Geometric Mean LOS (GMLOS) (days): 3 80  Days to GMLOS:0 9        OBJECTIVE:        Current admission status: Inpatient  Preferred Pharmacy:   [id number] [address] [address]  Phone: [phone] Fax: [phone]    Primary Care Provider: No primary care provider on file  Primary Insurance: VETERANS  Secondary Insurance: MEDICARE    PROGRESS NOTE:    Per chart review, patient likely needing lifevest prior to D/C  TT sent to cardiology to confirm  CM to follow

## 2023-02-03 NOTE — CASE MANAGEMENT
Case Management Progress Note    Patient name Girma White  Location /-01 MRN [id number]  : 1950 Date 2/3/2023       LOS (days): 3  Geometric Mean LOS (GMLOS) (days): 3 80  Days to GMLOS:0 7        OBJECTIVE:        Current admission status: Inpatient  Preferred Pharmacy:   [id number] [address] [address]  Phone: [phone] Fax: [phone]    Primary Care Provider: No primary care provider on file  Primary Insurance: VETERANS  Secondary Insurance: MEDICARE    PROGRESS NOTE:    LUCIA s/w Yousuf @ [address] regarding [place] confirmed receipt of order form and clinicals  Chary Castellon confirmed that order is pending final approval  Chary Castellon confirmed that if approved he has a fitter available to see patient tomorrow  Chary Castellon provided with CM office # if needing further assistance this weekend      Alejandra James, GIOVANI, ACSW, ACM, [facility]  23 4:07 PM

## 2023-02-03 NOTE — PHYSICAL THERAPY NOTE
Physical Therapy Treatment Note       02/03/23 1205   PT Last Visit   PT Visit Date 02/03/23   Note Type   Note type Evaluation   Pain Assessment   Pain Assessment Tool 0-10   Pain Score 5   Pain Location/Orientation Location: Rib Cage   Patient's Stated Pain Goal No pain   Hospital Pain Intervention(s) Ambulation/increased activity   Restrictions/Precautions   Weight Bearing Precautions Per Order No   Other Precautions Pain;Multiple lines;Telemetry   Home Living   Type of Home Mobile home   Additional Comments Resides w/ s/o in mobile home, few BLAISE  Indep self care, ambulates w/ out device   Prior Function   Level of [place] Independent with ADLs; Independent with functional mobility   Falls in the last 6 months 1 to 4  (1 leading to this admit)   General   Family/Caregiver Present Yes   Cognition   Overall Cognitive Status WFL   Arousal/Participation Responsive   Orientation Level Oriented X4   Memory Unable to assess   Following Commands Follows all commands and directions without difficulty   Subjective   Subjective states he feels OK  admits to rib pain, generalized weakness, fatigue  cooperative w/ eval   Coordination   Movements are Fluid and Coordinated 1   Bed Mobility   Supine to Sit 5  Supervision   Additional items Assist x 1; Increased time required   Transfers   Sit to Stand 5  Supervision   Additional items Assist x 1; Increased time required; Impulsive   Stand to Sit 5  Supervision   Additional items Assist x 1; Increased time required   Ambulation/Elevation   Gait pattern   (slow, antalgic, wide MANA, mild lateral sway)   Gait Assistance   (CGA/S)   Additional items Assist x 1   Assistive Device Rolling walker   Distance 100' x1, brief standing rest   Balance   Static Sitting Normal   Dynamic Sitting Good   Static Standing Fair   Dynamic Standing Fair   Ambulatory Fair   Endurance Deficit   Endurance Deficit Yes   Endurance Deficit Description fatigue, weakness, pain   Activity Tolerance Activity Tolerance Patient tolerated treatment well;Patient limited by fatigue;Patient limited by pain;Treatment limited secondary to medical complications (Comment)   Nurse Made Aware yes   Assessment   Prognosis Good   Problem List Decreased strength;Decreased endurance;Decreased coordination; Impaired balance;Decreased mobility;Pain   Assessment Pt seen for physical therapy evaluation  Pt is a 72 y/o male w/ history/comorbidities of HTN, [place] palsy, MDD, PTSD who is now admitted after found down unresponsive at home for no more than 15 min  CPR in the field, intubated on admit  Dx include acute toxic metabolic encephalopathy, cardiac arrest/ v-tach, and UDS + for THC and cocaine  Underwent cath 2/1- likely needs lifevest   Due to acute medical issues, pain, fall risk, note unstable clinical picture  PT consulted to assess mobility, d/c needs  Pt presents w/ decreased functional mob, standing balance, endurance, B LE strength, barriers at home  Pt will benefit from skilled PT to correct for the above problems  anticipate d/c home w/ s/o when stable, doubt he will have therapy needs  Goals   Patient Goals to rest   STG Expiration Date 02/17/23   Short Term Goal #1 1-2 wks: bed mob and transfers w/ indep, standing balance to good/normal dynamically, ambulate 200 ft w/ indep, increase B LE strength by 1/2 -1 grade, ambulate 3-5 stairs w/ S   PT Treatment Day 0   Plan   Treatment/Interventions Functional transfer training;LE strengthening/ROM; Therapeutic exercise; Endurance training;Patient/family training;Equipment eval/education;Gait training;Bed mobility;Elevations   PT Frequency 2-3x/wk   Recommendation   PT Discharge Recommendation No rehabilitation needs   AM-PAC Basic Mobility Inpatient   Turning in Flat Bed Without Bedrails 4   Lying on Back to Sitting on Edge of Flat Bed Without Bedrails 4   Moving Bed to Chair 3   Standing Up From Chair Using Arms 3   Walk in Room 3   Climb 3-5 Stairs With Railing 3   Basic Mobility Inpatient Raw Score 20   Basic Mobility Standardized Score 43 99   Highest Level Of Mobility   JH-HLM Goal 6: Walk 10 steps or more   JH-HLM Achieved 7: Walk 25 feet or more   Garth Marquez PT, DPT CSRS

## 2023-02-03 NOTE — CASE MANAGEMENT
Case Management Progress Note    Patient name Reina Alfaro  Location /-01 MRN [id number]  : 1950 Date 2/3/2023       LOS (days): 3  Geometric Mean LOS (GMLOS) (days): 3 80  Days to GMLOS:0 9        OBJECTIVE:        Current admission status: Inpatient  Preferred Pharmacy:   [address] [place] [address] #[id number] [address] [address]  Phone: [phone] Fax: [phone]    Primary Care Provider: No primary care provider on file  Primary Insurance: VETERANS  Secondary Insurance: MEDICARE    PROGRESS NOTE:    Completed Lifevest order form obtained  Form and clinicals sent to [facility] via fax and email  CM to follow for ETA

## 2023-02-03 NOTE — PROGRESS NOTES
INTERNAL MEDICINE RESIDENCY PROGRESS NOTE     Name: Cory Barcenas  Age & Sex: 71 y o  male   MRN: [id number]  Unit/Bed#: -01   Encounter: [id number]  Team: SOD C    PATIENT INFORMATION     Name: Cory Barcenas  Age & Sex: 71 y o  male   MRN: [id number]  Hospital Stay Days: 3    ASSESSMENT/PLAN     Principal Problem:    Cardiac arrest [facility])  Active Problems:    Wide-complex tachycardia    CHF (congestive heart failure) ([facility] )    Acute hypoxemic respiratory failure (HCC)    Multiple fractures of ribs, bilateral, initial encounter for closed fracture    Foreign body in stomach    Elevated troponin    Substance use disorder    Mood disorder ([facility] )    Pneumothorax    Thrombocytopenia ([facility])    Epistaxis    Pneumonia    Psoriasis    Coronary artery disease      * Cardiac arrest [facility])  Assessment & Plan  Presented after cardiac arrest at home, ROSC in the field without defibrillation or medications  1/3 rhythm strips from EMS showed wide complex tachycardia, other sinus rhythm  ? Unclear what rhythm patient was in during arrest    Assessment:  ? ECG on admission showing inferior Q waves and new T wave inversions in the anteroseptal leads on repeat  ? TTE on admission shows LVEF 25%, severely reduced systolic function, J9WS, moderate global hypokinesis  ? Etiology may be primary arrhythmia vs ACS vs hypoxia related to pneumonia vs substance use  Plan:  ? Cardiology following, appreciate recommendations  ? Underwent cath 02/01 that demonstrated 99% Prox RCA, 60% Mid RCA, and 60% Mid Cx  S/P angioplasty and DAVID x1   ? ASA 81 daily, high intensity statin, Plavix  ? See CHF plan  ? Continue Coreg 6 25 mg bid - consider increasing to 12 5mg if appropriate BP parameters  ? Amiodarone discontinued 2/3    Wide-complex tachycardia  Assessment & Plan  Present during transport (see media for rhythm strips from EMS)  ? Continue telemetry  ? Continue carvedilol  ? Cardiology consulted, appreciate recommendations  ?  EP consulted, appreciate recommendations  ? Amiodarone infusion transitioned to PO on 2/2, PO subsequently discontinued on 2/3    CHF (congestive heart failure) ([facility])  Assessment & Plan  Wt Readings from Last 3 Encounters:   02/01/23 82 kg (180 lb 12 4 oz)     Patient with new reduced EF 25% on TTE on admission in setting of recent cardiac arrest and signs of ischemia on ECG  · No history per patient and chart review  · Cardiology following, appreciate recommendations  · S/P Catheterization (see report above)  · Pt on optimized medical therapy, DAPT  - Continue Coreg 6 25 mg bid - consider increasing if to 12 5 mg if BP and HR permits  Pt continues to be asymptomatically bradycardic, stable BP   - Initiated Entresto 24/26 mg twice daily on 2/2  Continue  - No evidence of acute exacerbation, no need for diuretic at this time  - No indication for aldosterone antagonism at this time  -  LifeVest fitting today, eventual outpatient follow-up testing with echo in 90 days and if persistently low EF, refer for ICD    Acute hypoxemic respiratory failure [facility])  Assessment & Plan  Patient intubated in the field during cardiac arrest resuscitation, extubated upon arrival to ED  Now saturating 100% 12 L MFNC   Improved mentation and oxygen saturations  · Patient saturating appropriately on room air  · Incentive spirometry    Multiple fractures of ribs, bilateral, initial encounter for closed fracture  Assessment & Plan  Present on CTA PE on admission, anterior rib fractures 2-6 bilaterally 2/2 CPR during cardiac arrest  · Maintain adequate pain control to promote proper respiration:  · Scheduled Tylenol  · Lidocaine patches, Voltaren  · Oxycodone 5 mg as needed for moderate pain, oxycodone 10 mg as needed for severe pain, Dilaudid 0 5 mg as needed for breakthrough pain  · Incentive spirometry as tolerated      Foreign body in stomach  Assessment & Plan  CTA PE on admission shows metallic material in the lower esophagus; likely a tooth in setting of patient report of loss of a tooth  CXR and KUB show object in stomach 02/01  · Lower concern for ingestion of ilicit substance that may have precipitated cardiac arrest  · Tooth cap in colon on repeat KUB, anticipate that this will pass    Coronary artery disease  Assessment & Plan  S/P cardiac cath performed on 2/1/2023: 99% Prox RCA, 60% Mid RCA, and 60% Mid Cx  S/P angioplasty and DAVID x1    · ASA 81 mg and Plavix 75 mg, high intensity statin  · Cardiology consulted and appreciate recommendations      Psoriasis  Assessment & Plan  On Humira (Adalimumab) outpatient  · Hold Humira inpatient  · Add medium or high intensity topical steroid as needed such as triamcinolone or clobetasol    Pneumonia  Assessment & Plan  Present on CTA PE on admission, likely 2/2 to aspiration during cardiac arrest  · Follow up blood cultures from admission w/ NGTD  · Pt received 4 doses of CTX  Will discontinue today  Procal negative, no leukocytosis, pt improved clinically  · Trend fever/WBC    Recent Labs     01/31/23  1758 02/01/23  0546 02/03/23  0508   PROCALCITONI 0 22 0 33* 0 16       Epistaxis  Assessment & Plan  Reported on 02/01 am, left sided  Intermittent, responded to packing and pressure  · In setting of admitted history of cocaine use and history of nosebleeds  · If continue packing and pressure, saline flush  Consider Afrin  · Discontinued heparin following cath  Thrombocytopenia ([facility] [address] [facility] )  Assessment & Plan  189 on admission  · Improved today  No reports of epistaxis or other bleeding today  · Continue to monitor CBC     Recent Labs     01/31/23  1707 02/01/23  0546 02/02/23  0528 02/03/23  0508   * 130* 127* 142*       Pneumothorax  Assessment & Plan  Improved - not appreciable on follow up CXR  S/p cardiac arrest with CPR with broken ribs   CTA PE on admission shows trace anterior right pneumothorax subjacent to a rib facture  · Patient in no acute distress and hemodynamically stable on RA  · Stat CXR if patient suddenly requires increased O2 or become hemodynamically unstable  Mood disorder (HCC)  Assessment & Plan  Home medications Abilify 10 mg, Zoloft 100 mg, Wellbutrin 300 mg  Restarted 2/2    Substance use disorder  Assessment & Plan  Presented to the ED post cardiac arrest, UDS positive for cocaine and THC  Patient admits to cocaine use, reported for back pain, last use 2-3 days ago  ·  on discontinuation of substance use in setting of cardiac arrest, possible ACS and CHF  · Discuss alternative pain control options for back pain  · CRS consult    Elevated troponin  Assessment & Plan  S/p cardiac arrest - concern for NSTEMI  · ECG 01/31 showed ST depressions in lateral limb leads and anteroseptal leads in setting of chest pain  · Elevated trops to 350s  · Catheterization as noted above  · Cardiology following, appreciate recommendations    Leukocytosis-resolved as of 2/3/2023  Assessment & Plan  In setting of cardiac arrest, aspiration and pneumonia  · Improving   · Continue to monitor    Recent Labs     01/31/23  1707 02/01/23  0546 02/02/23  0528 02/03/23  0508   WBC 12 12* 11 87* 10 21* 3 28       Metabolic encephalopathy-resolved as of 2/1/2023  Assessment & Plan  Improved  Patient was somnolent but arousable on admission, frequently falling asleep during conversations  · S/p cardiac arrest with amnesia to the event  · Did no respond to Narcan in the field  · Acidotic on arrival with improving VBG one supplemental oxygen  · UDS positive for THC and cocaine on admission  · Monitor mental status      Disposition: inpatient pending optimized medical therapy and lifevest      SUBJECTIVE     Patient seen and examined  No acute events overnight  Pt states his only concern this AM is his continued bilateral rib pain  He states the only thing that helps is the delay at  He last had Dilaudid around 2 AM this morning    He did not notice any difference with the Voltaren or lidocaine patches  He denies any shortness of breath, palpitations, nausea, vomiting, lightheadedness, dizziness or any other concerns  He states he has not had a bowel movement since prior to admission but states he will have one this morning  OBJECTIVE     Vitals:    23 0500 23 0600 23 0742 23 1149   BP:   166/94 109/59   BP Location:    Left arm   Pulse:   57 (!) 53   Resp:   18 20   Temp:   98 2 °F (36 8 °C) 97 7 °F (36 5 °C)   TempSrc:   Oral Oral   SpO2:   95% 91%   Weight: 79 8 kg (176 lb) 79 8 kg (176 lb)     Height: 6' (1 829 m)         Temperature:   Temp (24hrs), Av 9 °F (36 6 °C), Min:97 5 °F (36 4 °C), Max:98 3 °F (36 8 °C)    Temperature: 97 7 °F (36 5 °C)  Intake & Output:  I/O        07 07 07 07 0701   07    P  O  420 118     I V  (mL/kg) 869 3 (11 3)      IV Piggyback 50      Total Intake(mL/kg) 1339 3 (17 4) 118 (1 5)     Urine (mL/kg/hr) 690 (0 4) 450 (0 2)     Total Output 690 450     Net +649 3 -332                Weights:   IBW (Ideal Body Weight): 77 6 kg    Body mass index is 23 87 kg/m²  Weight (last 2 days)     Date/Time Weight    23 06 79 8 (176)    23 0500 79 8 (176)    23 0528 77 1 (169 97)    23 0600 82 (180 78)        Physical Exam  Vitals and nursing note reviewed  Constitutional:       General: He is not in acute distress  Appearance: He is well-developed  HENT:      Head: Normocephalic and atraumatic  Nose: Nose normal       Mouth/Throat:      Mouth: Mucous membranes are moist    Eyes:      Conjunctiva/sclera: Conjunctivae normal    Cardiovascular:      Rate and Rhythm: Regular rhythm  Bradycardia present  Heart sounds: No murmur heard  Pulmonary:      Effort: Pulmonary effort is normal  No respiratory distress  Breath sounds: Normal breath sounds  Comments: shallow breathing  Chest:      Chest wall: Tenderness present     Abdominal:      General: There is no distension  Palpations: Abdomen is soft  Tenderness: There is no abdominal tenderness  Musculoskeletal:         General: No swelling  Cervical back: Neck supple  Right lower leg: No edema  Left lower leg: No edema  Skin:     General: Skin is warm and dry  Capillary Refill: Capillary refill takes less than 2 seconds  Neurological:      Mental Status: He is alert  Psychiatric:         Mood and Affect: Mood normal        LABORATORY DATA     Labs: I have personally reviewed pertinent reports  Results from last 7 days   Lab Units 02/03/23  0508 02/02/23  0528 02/01/23  0546   WBC Thousand/uL 7 98 10 21* 11 87*   HEMOGLOBIN g/dL 11 7* 11 3* 11 8*   HEMATOCRIT % 34 8* 35 2* 36 4*   PLATELETS Thousands/uL 142* 127* 130*   NEUTROS PCT % 70 83* 84*   MONOS PCT % 9 9 7      Results from last 7 days   Lab Units 02/03/23  0508 02/02/23  0528 02/01/23  0546 01/31/23  0858   POTASSIUM mmol/L 3 6 3 4* 3 8 4 3   CHLORIDE mmol/L 107 103 103 105   CO2 mmol/L 24 26 29 27   BUN mg/dL 17 20 17 13   CREATININE mg/dL 0 66 0 63 0 68 1 04   CALCIUM mg/dL 8 5 8 5 8 4 8 8   ALK PHOS U/L  --  62 66 103   ALT U/L  --  37 41 37   AST U/L  --  53* 46* 39     Results from last 7 days   Lab Units 02/02/23  0528 02/01/23  0546   MAGNESIUM mg/dL 2 2 2 3     Results from last 7 days   Lab Units 02/03/23  0508 02/02/23  0528 02/01/23  0546   PHOSPHORUS mg/dL 2 7 1 9* 3 1      Results from last 7 days   Lab Units 02/01/23  0830 01/31/23  2322 01/31/23  1707   INR   --   --  0 98   PTT seconds 66* 43* 24     Results from last 7 days   Lab Units 01/31/23  1159   LACTIC ACID mmol/L 1 0           IMAGING & DIAGNOSTIC TESTING     Radiology Results: I have personally reviewed pertinent reports    XR chest portable    Result Date: 2/1/2023  Impression: Right midlung opacity/consolidation, decreased from the previous study Left lower lung opacity may be due to atelectasis or infiltrate Workstation performed: [id number] XR abdomen 1 view kub    Result Date: 2/1/2023  Impression: Opaque foreign body in the left upper quadrant of the abdomen is unchanged in position as of the latest study on 2/1/2023 at 4:54 AM  This could represent a tooth fragment and should be correlated with physical exam the oral cavity  Workstation performed: [id number]     XR abdomen 1 view kub    Result Date: 2/1/2023  Impression: Opaque foreign body in the left upper quadrant of the abdomen is unchanged in position as of the latest study on 2/1/2023 at 4:54 AM  This could represent a tooth fragment and should be correlated with physical exam the oral cavity  Workstation performed: [id number]     CT head without contrast    Result Date: 1/31/2023  Impression: No evidence of acute intracranial process  Chronic microangiopathy  Workstation performed: [id number]     XR chest portable ICU    Result Date: 2/1/2023  Impression: Foreign body appears to be in the left upper quadrant of the abdomen  Hazy density probably represents effusion  Left lower lobe atelectasis versus infiltrate  Workstation performed: [id number]     CTA ED chest PE Study    Result Date: 1/31/2023  Impression: 1  No pulmonary embolism  2   Bilateral anterior 2nd- 6th rib fractures  Trace anterior right pneumothorax subjacent to a rib fracture  3   Right upper lobe pneumonia, likely due to aspiration  4   Interlobular septal thickening likely represents pulmonary edema  Groundglass opacity in the medial right upper lobe may be related to edema, infection or atelectasis  5   Significant bilateral posterior lower lobe segmental atelectasis  6   Metallic material in the lower esophagus  Finding may represent a foreign body  Correlation with prior procedural history is also advised  I personally discussed this study with Alex Chance on 1/31/2023 at 11:20 AM  Workstation performed: [id number]     Other Diagnostic Testing: I have personally reviewed pertinent reports      ACTIVE MEDICATIONS     Current Facility-Administered Medications   Medication Dose Route Frequency   • acetaminophen (TYLENOL) tablet 975 mg  975 mg Oral Q8H [address]   • ARIPiprazole (ABILIFY) tablet 10 mg  10 mg Oral Daily   • aspirin chewable tablet 81 mg  81 mg Oral Daily   • atorvastatin (LIPITOR) tablet 80 mg  80 mg Oral Daily With Dinner   • buPROPion (WELLBUTRIN XL) 24 hr tablet 300 mg  300 mg Oral Daily   • carvedilol (COREG) tablet 6 25 mg  6 25 mg Oral BID With Meals   • clopidogrel (PLAVIX) tablet 75 mg  75 mg Oral Daily   • Diclofenac Sodium (VOLTAREN) 1 % topical gel 2 g  2 g Topical 4x Daily   • heparin (porcine) subcutaneous injection 5,000 Units  5,000 Units Subcutaneous Q8H [address]   • HYDROmorphone (DILAUDID) injection 0 5 mg  0 5 mg Intravenous Q4H PRN   • insulin lispro (HumaLOG) 100 units/mL subcutaneous injection 1-6 Units  1-6 Units Subcutaneous HS   • insulin lispro (HumaLOG) 100 units/mL subcutaneous injection 1-6 Units  1-6 Units Subcutaneous 4 times day   • lidocaine (LIDODERM) 5 % patch 1 patch  1 patch Topical Daily   • nitroglycerin (NITROSTAT) SL tablet 0 4 mg  0 4 mg Sublingual Q5 Min PRN   • oxyCODONE (ROXICODONE) IR tablet 5 mg  5 mg Oral Q4H PRN    Or   • oxyCODONE (ROXICODONE) immediate release tablet 10 mg  10 mg Oral Q4H PRN   • potassium chloride (K-DUR,KLOR-CON) CR tablet 40 mEq  40 mEq Oral BID   • sacubitril-valsartan (ENTRESTO) 24-26 MG per tablet 1 tablet  1 tablet Oral BID   • sertraline (ZOLOFT) tablet 100 mg  100 mg Oral Daily   • trimethobenzamide (TIGAN) IM injection 200 mg  200 mg Intramuscular Q6H PRN       VTE Pharmacologic Prophylaxis: Heparin  VTE Mechanical Prophylaxis: sequential compression device    Portions of the record may have been created with voice recognition software  Occasional wrong word or "sound a like" substitutions may have occurred due to the inherent limitations of voice recognition software    Read the chart carefully and recognize, using context, where substitutions have occurred   ==  Leena Dodd MD  [id number]  Internal Medicine Residency PGY-1

## 2023-02-04 VITALS
TEMPERATURE: 97.8 F | DIASTOLIC BLOOD PRESSURE: 62 MMHG | OXYGEN SATURATION: 95 % | RESPIRATION RATE: 18 BRPM | WEIGHT: 177 LBS | BODY MASS INDEX: 23.98 KG/M2 | SYSTOLIC BLOOD PRESSURE: 115 MMHG | HEART RATE: 46 BPM | HEIGHT: 72 IN

## 2023-02-04 PROBLEM — D69.6 THROMBOCYTOPENIA (HCC): Status: RESOLVED | Noted: 2023-02-01 | Resolved: 2023-02-04

## 2023-02-04 PROBLEM — R04.0 EPISTAXIS: Status: RESOLVED | Noted: 2023-02-01 | Resolved: 2023-02-04

## 2023-02-04 LAB
ANION GAP SERPL CALCULATED.3IONS-SCNC: 7 MMOL/L (ref 4–13)
BASOPHILS # BLD AUTO: 0.07 THOUSANDS/ÂΜL (ref 0–0.1)
BASOPHILS NFR BLD AUTO: 1 % (ref 0–1)
BUN SERPL-MCNC: 16 MG/DL (ref 5–25)
CALCIUM SERPL-MCNC: 8.4 MG/DL (ref 8.3–10.1)
CHLORIDE SERPL-SCNC: 109 MMOL/L (ref 96–108)
CO2 SERPL-SCNC: 23 MMOL/L (ref 21–32)
CREAT SERPL-MCNC: 0.64 MG/DL (ref 0.6–1.3)
EOSINOPHIL # BLD AUTO: 0.29 THOUSAND/ÂΜL (ref 0–0.61)
EOSINOPHIL NFR BLD AUTO: 4 % (ref 0–6)
ERYTHROCYTE [DISTWIDTH] IN BLOOD BY AUTOMATED COUNT: 13.7 % (ref 11.6–15.1)
GFR SERPL CREATININE-BSD FRML MDRD: 98 ML/MIN/1.73SQ M
GLUCOSE SERPL-MCNC: 105 MG/DL (ref 65–140)
GLUCOSE SERPL-MCNC: 121 MG/DL (ref 65–140)
GLUCOSE SERPL-MCNC: 121 MG/DL (ref 65–140)
HCT VFR BLD AUTO: 35.3 % (ref 36.5–49.3)
HGB BLD-MCNC: 11.8 G/DL (ref 12–17)
IMM GRANULOCYTES # BLD AUTO: 0.06 THOUSAND/UL (ref 0–0.2)
IMM GRANULOCYTES NFR BLD AUTO: 1 % (ref 0–2)
LYMPHOCYTES # BLD AUTO: 1.63 THOUSANDS/ÂΜL (ref 0.6–4.47)
LYMPHOCYTES NFR BLD AUTO: 22 % (ref 14–44)
MCH RBC QN AUTO: 31.4 PG (ref 26.8–34.3)
MCHC RBC AUTO-ENTMCNC: 33.4 G/DL (ref 31.4–37.4)
MCV RBC AUTO: 94 FL (ref 82–98)
MONOCYTES # BLD AUTO: 0.8 THOUSAND/ÂΜL (ref 0.17–1.22)
MONOCYTES NFR BLD AUTO: 11 % (ref 4–12)
NEUTROPHILS # BLD AUTO: 4.56 THOUSANDS/ÂΜL (ref 1.85–7.62)
NEUTS SEG NFR BLD AUTO: 61 % (ref 43–75)
NRBC BLD AUTO-RTO: 0 /100 WBCS
PLATELET # BLD AUTO: 168 THOUSANDS/UL (ref 149–390)
PMV BLD AUTO: 10.9 FL (ref 8.9–12.7)
POTASSIUM SERPL-SCNC: 3.8 MMOL/L (ref 3.5–5.3)
RBC # BLD AUTO: 3.76 MILLION/UL (ref 3.88–5.62)
SODIUM SERPL-SCNC: 139 MMOL/L (ref 135–147)
WBC # BLD AUTO: 7.41 THOUSAND/UL (ref 4.31–10.16)

## 2023-02-04 RX ORDER — OXYCODONE HYDROCHLORIDE 5 MG/1
5 TABLET ORAL EVERY 4 HOURS PRN
Qty: 24 TABLET | Refills: 0 | Status: SHIPPED | OUTPATIENT
Start: 2023-02-04

## 2023-02-04 RX ORDER — LIDOCAINE 50 MG/G
1 PATCH TOPICAL DAILY
Qty: 30 PATCH | Refills: 0 | Status: SHIPPED | OUTPATIENT
Start: 2023-02-05 | End: 2023-03-07

## 2023-02-04 RX ORDER — POTASSIUM CHLORIDE 20 MEQ/1
40 TABLET, EXTENDED RELEASE ORAL ONCE
Status: COMPLETED | OUTPATIENT
Start: 2023-02-04 | End: 2023-02-04

## 2023-02-04 RX ORDER — ATORVASTATIN CALCIUM 80 MG/1
80 TABLET, FILM COATED ORAL
Qty: 30 TABLET | Refills: 0 | Status: SHIPPED | OUTPATIENT
Start: 2023-02-04 | End: 2023-03-06

## 2023-02-04 RX ORDER — LOSARTAN POTASSIUM 25 MG/1
25 TABLET ORAL DAILY
Qty: 30 TABLET | Refills: 0 | Status: SHIPPED | OUTPATIENT
Start: 2023-02-05 | End: 2023-03-07

## 2023-02-04 RX ORDER — ASPIRIN 81 MG/1
81 TABLET, CHEWABLE ORAL DAILY
Refills: 0
Start: 2023-02-05

## 2023-02-04 RX ORDER — CLOPIDOGREL BISULFATE 75 MG/1
75 TABLET ORAL DAILY
Qty: 30 TABLET | Refills: 0 | Status: SHIPPED | OUTPATIENT
Start: 2023-02-05 | End: 2023-03-07

## 2023-02-04 RX ORDER — LOSARTAN POTASSIUM 25 MG/1
25 TABLET ORAL DAILY
Status: DISCONTINUED | OUTPATIENT
Start: 2023-02-05 | End: 2023-02-04 | Stop reason: HOSPADM

## 2023-02-04 RX ORDER — CARVEDILOL 6.25 MG/1
6.25 TABLET ORAL 2 TIMES DAILY WITH MEALS
Qty: 60 TABLET | Refills: 0 | Status: SHIPPED | OUTPATIENT
Start: 2023-02-04 | End: 2023-03-06

## 2023-02-04 RX ADMIN — OXYCODONE HYDROCHLORIDE 10 MG: 10 TABLET ORAL at 12:12

## 2023-02-04 RX ADMIN — HEPARIN SODIUM 5000 UNITS: 5000 INJECTION INTRAVENOUS; SUBCUTANEOUS at 05:22

## 2023-02-04 RX ADMIN — CLOPIDOGREL BISULFATE 75 MG: 75 TABLET ORAL at 08:02

## 2023-02-04 RX ADMIN — ASPIRIN 81 MG CHEWABLE TABLET 81 MG: 81 TABLET CHEWABLE at 08:01

## 2023-02-04 RX ADMIN — ARIPIPRAZOLE 10 MG: 10 TABLET ORAL at 08:01

## 2023-02-04 RX ADMIN — OXYCODONE HYDROCHLORIDE 10 MG: 10 TABLET ORAL at 08:02

## 2023-02-04 RX ADMIN — SERTRALINE 100 MG: 100 TABLET, FILM COATED ORAL at 08:02

## 2023-02-04 RX ADMIN — ACETAMINOPHEN 975 MG: 325 TABLET ORAL at 05:23

## 2023-02-04 RX ADMIN — SACUBITRIL AND VALSARTAN 1 TABLET: 24; 26 TABLET, FILM COATED ORAL at 08:01

## 2023-02-04 RX ADMIN — CARVEDILOL 6.25 MG: 6.25 TABLET, FILM COATED ORAL at 07:58

## 2023-02-04 RX ADMIN — OXYCODONE HYDROCHLORIDE 10 MG: 10 TABLET ORAL at 02:34

## 2023-02-04 RX ADMIN — BUPROPION HYDROCHLORIDE 300 MG: 150 TABLET, FILM COATED, EXTENDED RELEASE ORAL at 08:01

## 2023-02-04 RX ADMIN — POTASSIUM CHLORIDE 40 MEQ: 1500 TABLET, EXTENDED RELEASE ORAL at 08:02

## 2023-02-04 NOTE — DISCHARGE SUMMARY
INTERNAL MEDICINE RESIDENCY DISCHARGE SUMMARY     Roscoe Torres    71 y o  male  MRN: [id number]  Room/Bed: /-01     [facility] 5   Encounter: [id number]    Principal Problem:    Cardiac arrest [facility])  Active Problems:    Wide-complex tachycardia    CHF (congestive heart failure) ([facility] )    Acute hypoxemic respiratory failure (HCC)    Multiple fractures of ribs, bilateral, initial encounter for closed fracture    Foreign body in stomach    Elevated troponin    Substance use disorder    Mood disorder ([facility] )    Pneumothorax    Thrombocytopenia (HCC)    Epistaxis    Pneumonia    Psoriasis    Coronary artery disease      * Cardiac arrest [facility])  Assessment & Plan  Presented after cardiac arrest at home, ROSC in the field without defibrillation or medications  1/3 rhythm strips from EMS showed wide complex tachycardia, other sinus rhythm  Unclear what rhythm patient was in during arrest    Assessment:  ECG on admission showing inferior Q waves and new T wave inversions in the anteroseptal leads on repeat  TTE on admission shows LVEF 25%, severely reduced systolic function, V6AS, moderate global hypokinesis  Etiology may be primary arrhythmia vs ACS vs hypoxia related to pneumonia vs substance use  Plan:  Cardiology following, appreciate recommendations  Underwent cath 02/01 that demonstrated 99% Prox RCA, 60% Mid RCA, and 60% Mid Cx  S/P angioplasty and DAVID x1  Continue aspirin 81 mg daily  Continue atorvastatin 80 mg daily  Continue carvedilol 6 25 mg 2 times daily  Continue Plavix 75 mg daily  Continue losartan 25 mg daily  Amiodarone discontinued 2/3  Follow-up with cardiology as outpatient    Patient discharged with LifeVest   Patient will need a repeat echo as outpatient, if EF remains low he will need a referral back to electrophysiology for ICD placement    Wide-complex tachycardia  Assessment & Plan  Present during transport (see media for rhythm strips from EMS)  Continue carvedilol  Cardiology consulted, appreciate recommendations  EP consulted, appreciate recommendations  Amiodarone infusion transitioned to PO on 2/2, PO subsequently discontinued on 2/3  Patient discharged with LifeVest    CHF (congestive heart failure) ([facility] [address] )  Assessment & Plan  Wt Readings from Last 3 Encounters:   02/01/23 82 kg (180 lb 12 4 oz)     Patient with new reduced EF 25% on TTE on admission in setting of recent cardiac arrest and signs of ischemia on ECG  No history per patient and chart review  Cardiology following, appreciate recommendations  S/P Catheterization (see report above)  Pt on optimized medical therapy, DAPT  Continue Coreg 6 25 mg bid - consider increasing if to 12 5 mg if BP and HR permits  Pt continues to be asymptomatically bradycardic, stable BP  [place] Crick was discontinued secondary to low blood pressures  Continue losartan 25 mg daily   no evidence of acute exacerbation, no need for diuretic at this time  No indication for aldosterone antagonism at this time   LifeVest fitting today, eventual outpatient follow-up testing with echo in 90 days and if persistently low EF, refer for ICD    Acute hypoxemic respiratory failure [facility])  Assessment & Plan  Patient intubated in the field during cardiac arrest resuscitation, extubated upon arrival to ED  Now saturating 100% 12 L MFNC   Improved mentation and oxygen saturations  Patient saturating appropriately on room air  Incentive spirometry    Multiple fractures of ribs, bilateral, initial encounter for closed fracture  Assessment & Plan  Present on CTA PE on admission, anterior rib fractures 2-6 bilaterally 2/2 CPR during cardiac arrest  Maintain adequate pain control to promote proper respiration:  Continue Tylenol   Continue lidocaine patches, Voltaren  Discharged with short course of oxycodone 5 mg as needed every 4 hours for severe pain  Incentive spirometry as tolerated      Foreign body in stomach  Assessment & Plan  CTA PE on admission shows metallic material in the lower esophagus; likely a tooth in setting of patient report of loss of a tooth  CXR and KUB show object in stomach 02/01  Lower concern for ingestion of ilicit substance that may have precipitated cardiac arrest  Tooth cap in colon on repeat KUB, anticipate that this will pass    Coronary artery disease  Assessment & Plan  S/P cardiac cath performed on 2/1/2023: 99% Prox RCA, 60% Mid RCA, and 60% Mid Cx  S/P angioplasty and DAVID x1  ASA 81 mg and Plavix 75 mg, high intensity statin  GDMT per cardiology recommendations as noted under cardiac arrest assessment/plan      Psoriasis  Assessment & Plan  On Humira (Adalimumab) outpatient  Held Humira inpatient    Pneumonia  Assessment & Plan  Present on CTA PE on admission, likely 2/2 to aspiration during cardiac arrest  Follow up blood cultures from admission w/ NGTD  Pt received 4 doses of CTX  Discontinued 2/3  Recent Labs     01/31/23  1758 02/01/23  0546 02/03/23  0508   PROCALCITONI 0 22 0 33* 0 16       Pneumothorax  Assessment & Plan  Improved - not appreciable on follow up CXR  S/p cardiac arrest with CPR with broken ribs  CTA PE on admission shows trace anterior right pneumothorax subjacent to a rib facture  Patient in no acute distress and hemodynamically stable on RA      Mood disorder [facility])  Assessment & Plan  Home medications Abilify 10 mg, Zoloft 100 mg, Wellbutrin 300 mg  Restarted 2/2  Continue home meds    Substance use disorder  Assessment & Plan  Presented to the ED post cardiac arrest, UDS positive for cocaine and THC  Patient admits to cocaine use, reported for back pain, last use 2-3 days ago   on discontinuation of substance use in setting of cardiac arrest, possible ACS and CHF  Discuss alternative pain control options for back pain  CRS consult-patient denied on day of discharge  He plans to refrain from future cocaine use      Elevated troponin  Assessment & Plan  S/p cardiac arrest - concern for NSTEMI  ECG 01/31 showed ST depressions in lateral limb leads and anteroseptal leads in setting of chest pain  Elevated trops to 350s  Catheterization as noted above    Epistaxis-resolved as of 2/4/2023  Assessment & Plan  Reported on 02/01 am, left sided  Intermittent, responded to packing and pressure  In setting of admitted history of cocaine use and history of nosebleeds  If continue packing and pressure, saline flush  Consider Afrin  Discontinued heparin following cath  Leukocytosis-resolved as of 2/3/2023  Assessment & Plan  In setting of cardiac arrest, aspiration and pneumonia  Improving   Continue to monitor    Recent Labs     01/31/23  1707 02/01/23  0546 02/02/23  0528 02/03/23  0508   WBC 12 12* 11 87* 10 21* 7 98       Thrombocytopenia (HCC)-resolved as of 2/4/2023  Assessment & Plan  189 on admission  Improved today  Recent Labs     02/02/23  0528 02/03/23  0508 02/04/23  0500   * 142* 937       Metabolic encephalopathy-resolved as of 2/1/2023  Assessment & Plan  Improved  Patient was somnolent but arousable on admission, frequently falling asleep during conversations  S/p cardiac arrest with amnesia to the event  Did no respond to Narcan in the field  Acidotic on arrival with improving VBG one supplemental oxygen  UDS positive for THC and cocaine on admission  Monitor mental status      [address]     Per [place] critical care progress note from 2/1: "Steve Record  Is a 71 y o  male with PMHx of HTN, Bell's palsy, recurrent MDD, and PTSD who presented [facility] ED via EMS after cardiac arrest at home  He received CPR and ROSC was obtained without defibrillation  Patient denies memory of the incident, and denies substance use prior to the event, last admitted cocaine use 2-3 days ago  UDS positive for THC and cocaine, and family has since reported he was observed snorting cocaine approximately 30 minutes before he was found down   En route a Johnathon airway was placed and ECG showed a run of wide complex tachycardia (strips available in media), but by the time patient arrived in the ED patient was in NSR, awake and alert and attempting to remove the airway himself  He was started on amiodarone infusion without bolus and was weaned to MATILDE GUAMAN  [facility], initially 8L/min now stable at 12 L/min  Vitals also showed elevated heart rates on presentation and hypertension which improved since admission  Labs notable for lactate 4 0 > 1 0 with fluids, mild leukocytosis, mild Hgb depression to 11 8, VBG 7 05/91 5/39 4 on admission improved with resusciatation  Troponins elevated over his admission to peak of 354  CT head was negative for intracranial abnormalities  CTA PE showed no PE, bilateral anterior rib fractures in ribs 2-6 with trace anterior pneumothorax adjacent to a rib fracture, RUL pneumonia vs pneumonitis likely due to aspiration, as well as pulmonary edema with GGO in RUL related to edema vs infection vs atelectasis  CTA also showed metallic material in the lower esophagus suspicious for a tooth due to imaging and patient interview  ECHO showed LVEF of 25% with severe systolic dysfunction, L1EZ, and moderate global hypokinesis  ECG evolved to show signs of ischemia      Cardiology consulted, recommended cath and EP consult for ICD for secondary prevention in setting of ECG concerning for ischemia and heart failure  Overnight patient was given Dilaudid for rib pain  Is on 12 L Midflow saturating in the high 90's  Hemodynamically stable  This am had spontaneous epistaxis intermittently  Team was notified by nursing that bleeding had increased and the nose was packed with improvement  Patient underwent cardiac cath on 2/1/2023  Results revealed: 99% Prox RCA, 60% Mid RCA, and 60% Mid Cx  S/P angioplasty and DAVID x1  Patient continued on ASA/Brilinta and heparin gtt discontinued   Following cath, patient remained hemodynamically stable and without any critical care needs  Patient was transferred to the medical surgical floor with telemetry under the service of SOD "    Remainder of hospital course:  Patient steadily improved throughout hospitalization  Per cardiology and EP, patient was transitioned off of amiodarone and medical therapy was optimized (see A/P for medication details)  Patient unable to tolerate Entresto secondary to low blood pressures and was transitioned to losartan  Patient continued to have significant pain secondary to his bilateral rib fractures, improved with as needed Dilaudid and oxycodone  Pt was instructed to use lidocaine patches, tylenol and a short course of PRN oxycodone 4 mg on discharge for rib pain  He was discontinued off ceftriaxone after receiving a total of 4 doses with no subsequent fevers, leukocytosis or shortness of breath  On day of discharge, patient stated his pain was improved overall  He denied any shortness of breath, fevers, chills, nausea, vomiting or any other concerns  He had a bowel movement and was eating and drinking without difficulty  Patient was successfully fitted for his LifeVest and discharged home  A certified  was consulted, however on day of discharge patient denied consult  He stated that he plans on fully refraining from cocaine  Follow ups: Follow up with PCP within 1 weeks   Follow up with cardiology within 2 weeks  Pt will need a follow up echo, if persistently low, patient will need a referral back to EP for ICD placement  DISCHARGE INFORMATION     Vitals on discharge:   /62   Pulse (!) 46   Temp 97 8 °F (36 6 °C)   Resp 18   Ht 6' (1 829 m)   Wt 80 3 kg (177 lb)   SpO2 95%   BMI 24 01 kg/m²     Physical Exam on Discharge:  Physical Exam  Vitals and nursing note reviewed  Constitutional:       General: He is not in acute distress  Appearance: He is well-developed        Comments: Patient sitting up at side of bed, appears comfortable   HENT: Head: Normocephalic and atraumatic  Nose: Nose normal       Mouth/Throat:      Mouth: Mucous membranes are moist    Eyes:      Conjunctiva/sclera: Conjunctivae normal    Cardiovascular:      Rate and Rhythm: Normal rate and regular rhythm  Heart sounds: No murmur heard  Pulmonary:      Effort: Pulmonary effort is normal  No respiratory distress  Breath sounds: Normal breath sounds  No wheezing, rhonchi or rales  Chest:      Chest wall: Tenderness present  Abdominal:      General: There is no distension  Palpations: Abdomen is soft  Tenderness: There is no abdominal tenderness  Musculoskeletal:         General: No swelling  Cervical back: Neck supple  Right lower leg: No edema  Left lower leg: No edema  Skin:     General: Skin is warm and dry  Capillary Refill: Capillary refill takes less than 2 seconds  Neurological:      Mental Status: He is alert  Psychiatric:         Mood and Affect: Mood normal            PCP at Discharge: No primary care provider on file  Admitting Provider: Marylee Bone, MD  Admission Date: 1/31/2023    Discharge Provider:  Smitha Hart MD  Discharge Date: 02/04/23    Discharge Disposition: home  Discharge Condition: good  Discharge with Lines: no    Discharge Diet: cardiac diet  Activity Restrictions:  no strenuous activity  Test Results Pending at Discharge: none    Discharge Diagnoses:  Principal Problem:    Cardiac arrest [facility])  Active Problems:    Wide-complex tachycardia    [facility]) ([facility] )    Acute hypoxemic respiratory failure (HCC)    Multiple fractures of ribs, bilateral, initial encounter for closed fracture    Foreign body in stomach    Elevated troponin    Substance use disorder    Mood disorder ([facility] )    Pneumothorax    Thrombocytopenia (HCC)    Epistaxis    Pneumonia    Psoriasis    Coronary artery disease  Resolved Problems:    Metabolic encephalopathy    Leukocytosis      Consulting Providers:   EP, cardiology    Diagnostic & Therapeutic Procedures Performed:  XR chest portable    Result Date: 2/1/2023  Impression: Right midlung opacity/consolidation, decreased from the previous study Left lower lung opacity may be due to atelectasis or infiltrate Workstation performed: [id number]     XR abdomen 1 view kub    Result Date: 2/3/2023  Impression: Previous radiopaque foreign body in the left upper quadrant is now seen in the right lower quadrant possibly in the cecal region  Stool in the colon  Workstation performed: [id number]     XR abdomen 1 view kub    Result Date: 2/1/2023  Impression: Opaque foreign body in the left upper quadrant of the abdomen is unchanged in position as of the latest study on 2/1/2023 at 4:54 AM  This could represent a tooth fragment and should be correlated with physical exam the oral cavity  Workstation performed: [id number]     XR abdomen 1 view kub    Result Date: 2/1/2023  Impression: Opaque foreign body in the left upper quadrant of the abdomen is unchanged in position as of the latest study on 2/1/2023 at 4:54 AM  This could represent a tooth fragment and should be correlated with physical exam the oral cavity  Workstation performed: [id number]     CT head without contrast    Result Date: 1/31/2023  Impression: No evidence of acute intracranial process  Chronic microangiopathy  Workstation performed: [id number]     XR chest portable ICU    Result Date: 2/1/2023  Impression: Foreign body appears to be in the left upper quadrant of the abdomen  Hazy density probably represents effusion  Left lower lobe atelectasis versus infiltrate  Workstation performed: [id number]     CTA ED chest PE Study    Result Date: 1/31/2023  Impression: 1  No pulmonary embolism  2   Bilateral anterior 2nd- 6th rib fractures  Trace anterior right pneumothorax subjacent to a rib fracture  3   Right upper lobe pneumonia, likely due to aspiration   4   Interlobular septal thickening likely represents pulmonary edema  Groundglass opacity in the medial right upper lobe may be related to edema, infection or atelectasis  5   Significant bilateral posterior lower lobe segmental atelectasis  6   Metallic material in the lower esophagus  Finding may represent a foreign body  Correlation with prior procedural history is also advised  I personally discussed this study with Jose Avendano on 1/31/2023 at 11:20 AM  Workstation performed: [id number]       Code Status: Level 1 - Full Code  Advance Directive & Living Will: <no information>  Power of :    POLST:      Medications:   Current Discharge Medication List        Current Discharge Medication List      START taking these medications    Details   aspirin 81 mg chewable tablet Chew 1 tablet (81 mg total) daily Do not start before February 5, 2023  Refills: 0    Associated Diagnoses: Status post insertion of drug eluting coronary artery stent; Coronary artery disease      atorvastatin (LIPITOR) 80 mg tablet Take 1 tablet (80 mg total) by mouth daily with dinner  Qty: 30 tablet, Refills: 0    Associated Diagnoses: Status post insertion of drug eluting coronary artery stent; Coronary artery disease      carvedilol (COREG) 6 25 mg tablet Take 1 tablet (6 25 mg total) by mouth 2 (two) times a day with meals  Qty: 60 tablet, Refills: 0    Associated Diagnoses: Status post insertion of drug eluting coronary artery stent; Coronary artery disease      clopidogrel (PLAVIX) 75 mg tablet Take 1 tablet (75 mg total) by mouth daily Do not start before February 5, 2023  Qty: 30 tablet, Refills: 0    Associated Diagnoses: Status post insertion of drug eluting coronary artery stent; Coronary artery disease      lidocaine (LIDODERM) 5 % Apply 1 patch topically over 12 hours daily Remove & Discard patch within 12 hours or as directed by MD Do not start before February 5, 2023    Qty: 30 patch, Refills: 0    Associated Diagnoses: Multiple fractures of ribs, bilateral, initial encounter for closed fracture      losartan (COZAAR) 25 mg tablet Take 1 tablet (25 mg total) by mouth daily Do not start before February 5, 2023  Qty: 30 tablet, Refills: 0    Associated Diagnoses: Status post insertion of drug eluting coronary artery stent; Coronary artery disease      oxyCODONE (ROXICODONE) 5 immediate release tablet Take 1 tablet (5 mg total) by mouth every 4 (four) hours as needed for moderate pain Max Daily Amount: 30 mg  Qty: 24 tablet, Refills: 0    Associated Diagnoses: Multiple fractures of ribs, bilateral, initial encounter for closed fracture           Current Discharge Medication List        Current Discharge Medication List      CONTINUE these medications which have NOT CHANGED    Details   ARIPiprazole (ABILIFY) 10 mg tablet 10 mg      buPROPion (WELLBUTRIN XL) 300 mg 24 hr tablet 300 mg      sertraline (ZOLOFT) 100 mg tablet 100 mg               Allergies:  No Known Allergies    FOLLOW-UP     PCP Outpatient Follow-up:  Follow up with your primary care provider within 1 week following discharge    Consulting Providers Follow-up:  yes  cardiology     Active Issues Requiring Follow-up:   reduced EF, recent cardiac arrest, rib fxs    Discharge Statement:   I spent 30 minutes discharging the patient  This time was spent on the day of discharge  I had direct contact with the patient on the day of discharge  Additional documentation is required if more than 30 minutes were spent on discharge  Portions of the record may have been created with voice recognition software  Occasional wrong word or "sound a like" substitutions may have occurred due to the inherent limitations of voice recognition software    Read the chart carefully and recognize, using context, where substitutions have occurred     ==  Jan Rinaldi MD  [facility]  Internal Medicine Resident PGY-1

## 2023-02-04 NOTE — PROGRESS NOTES
General Cardiology Progress Note   Severiano Zahl  71 y o  male MRN: [id number]  Unit/Bed#: -01 Encounter: [id number]      Assessment:  Principal Problem:    Cardiac arrest [facility])  Active Problems:    Wide-complex tachycardia    Elevated troponin    CHF (congestive heart failure) (HCC)    Acute hypoxemic respiratory failure (HCC)    Multiple fractures of ribs, bilateral, initial encounter for closed fracture    Foreign body in stomach    Substance use disorder    Mood disorder (HCC)    Pneumothorax    Thrombocytopenia (HCC)    Epistaxis    Pneumonia    Psoriasis    Coronary artery disease      Impression:    • Cocaine abuse  • Out of hospital cardiac arrest, Status post CPR  • Small pneumothorax due to CPR  • Severe ischemic cardiomyopathy  o No evidence of CHF  o Carvedilol started  o Entresto started  o Has not required diuretic  o Hypotensive overnight 86/51  o Renal function normal  • Coronary artery disease, status post ostial RCA stenting, nonobstructive disease elsewhere  • Lipids-statin initiated  •   Plan:    Hold Entresto  Can start losartan 25 mg daily tomorrow  No diuretic  LifeVest  Outpatient follow-up, outpatient follow-up echo, if persistently low EF, refer back to EP for ICD  Blood pressure is normalized today, stable for discharge from my standpoint    Subjective:   Patient seen and examined  He offers me no complaints this morning    Review of Systems   Constitutional: Negative for diaphoresis, fever, malaise/fatigue and night sweats  Cardiovascular: Negative for chest pain, dyspnea on exertion, leg swelling, orthopnea, palpitations and syncope  Respiratory: Negative for cough, shortness of breath, sputum production and wheezing  Skin: Negative for itching and rash  Gastrointestinal: Negative for abdominal pain, change in bowel habit and diarrhea  Neurological: Negative for dizziness, focal weakness, light-headedness and weakness         Objective   Vitals: Blood pressure 157/83, pulse (!) 54, temperature 97 6 °F (36 4 °C), resp  rate 18, height 6' (1 829 m), weight 80 3 kg (177 lb), SpO2 95 %  , Body mass index is 24 01 kg/m² , I/O last 3 completed shifts: In: 1540 [P O :1540]  Out: 950 [Urine:950]  I/O this shift:  In: 300 [P O :300]  Out: -   Wt Readings from Last 3 Encounters:   02/04/23 80 3 kg (177 lb)       Intake/Output Summary (Last 24 hours) at 2/4/2023 1037  Last data filed at 2/4/2023 0900  Gross per 24 hour   Intake 1720 ml   Output 550 ml   Net 1170 ml     I/O last 3 completed shifts: In: 1540 [P O :1540]  Out: 950 [Urine:950]    No significant arrhythmias seen on telemetry review  Physical Exam  Constitutional:       General: He is not in acute distress  Appearance: He is not diaphoretic  HENT:      Head: Normocephalic and atraumatic  Neck:      Vascular: No JVD  Cardiovascular:      Rate and Rhythm: Normal rate and regular rhythm  Heart sounds: Normal heart sounds  No murmur heard  Pulmonary:      Effort: Pulmonary effort is normal  No respiratory distress  Breath sounds: Normal breath sounds  No wheezing or rales  Abdominal:      General: Bowel sounds are normal  There is no distension  Palpations: Abdomen is soft  Tenderness: There is no abdominal tenderness  Musculoskeletal:      Cervical back: Normal range of motion and neck supple  Right lower leg: No edema  Left lower leg: No edema  Skin:     General: Skin is warm and dry  Neurological:      Mental Status: He is alert and oriented to person, place, and time           Meds/Allergies   No Known Allergies    Current Facility-Administered Medications:   •  acetaminophen (TYLENOL) tablet 975 mg, 975 mg, Oral, Q8H [facility], Mehreen Downs MD, 975 mg at 02/04/23 8040  •  ARIPiprazole (ABILIFY) tablet 10 mg, 10 mg, Oral, Daily, Mehreen Downs MD, 10 mg at 02/04/23 0801  •  aspirin chewable tablet 81 mg, 81 mg, Oral, Daily, Sue Jefferson DO, 81 mg at 02/04/23 0801  •  atorvastatin (LIPITOR) tablet 80 mg, 80 mg, Oral, Daily With Dinner, Sue Jefferson, DO, 80 mg at 02/03/23 1743  •  buPROPion (WELLBUTRIN XL) 24 hr tablet 300 mg, 300 mg, Oral, Daily, Anthony Auguste MD, 300 mg at 02/04/23 0801  •  carvedilol (COREG) tablet 6 25 mg, 6 25 mg, Oral, BID With Meals, Sue Jefferson, DO, 6 25 mg at 02/04/23 5991  •  clopidogrel (PLAVIX) tablet 75 mg, 75 mg, Oral, Daily, Lisa Farah MD, 75 mg at 02/04/23 0802  •  Diclofenac Sodium (VOLTAREN) 1 % topical gel 2 g, 2 g, Topical, 4x Daily, Barbie Puentes MD, 2 g at 02/03/23 1746  •  heparin (porcine) subcutaneous injection 5,000 Units, 5,000 Units, Subcutaneous, Q8H [address], Sue sarita, DO, 5,000 Units at 02/04/23 0522  •  HYDROmorphone (DILAUDID) injection 0 5 mg, 0 5 mg, Intravenous, Q4H PRN, Anthony Auguste MD  •  insulin lispro (HumaLOG) 100 units/mL subcutaneous injection 1-6 Units, 1-6 Units, Subcutaneous, HS, Sue Jefferson, DO, 1 Units at 02/03/23 2139  •  insulin lispro (HumaLOG) 100 units/mL subcutaneous injection 1-6 Units, 1-6 Units, Subcutaneous, 4 times day **AND** Fingerstick Glucose (POCT), , , 4x Daily, Anthony Auguste MD  •  lidocaine (LIDODERM) 5 % patch 1 patch, 1 patch, Topical, Daily, Anthony Auguste MD, 1 patch at 02/03/23 0839  •  [START ON 2/5/2023] losartan (COZAAR) tablet 25 mg, 25 mg, Oral, Daily, Gabby Michelle MD  •  nitroglycerin (NITROSTAT) SL tablet 0 4 mg, 0 4 mg, Sublingual, Q5 Min PRN, Barbie Puentes MD  •  oxyCODONE (ROXICODONE) IR tablet 5 mg, 5 mg, Oral, Q4H PRN **OR** oxyCODONE (ROXICODONE) immediate release tablet 10 mg, 10 mg, Oral, Q4H PRN, Anthony Auguste MD, 10 mg at 02/04/23 0802  •  sertraline (ZOLOFT) tablet 100 mg, 100 mg, Oral, Daily, Anthony Auguste MD, 100 mg at 02/04/23 0802  •  trimethobenzamide (TIGAN) IM injection 200 mg, 200 mg, Intramuscular, Q6H PRN, Sue Jefferson DO, 200 mg at 02/01/23 1808    Laboratory Results:        CBC with diff: Results from last 7 days   Lab Units 02/04/23  0500 02/03/23  0508 02/02/23  0528 02/01/23  0546 01/31/23  1707 01/31/23  0858   WBC Thousand/uL 7 41 7 98 10 21* 11 87* 12 12* 11 81*   HEMOGLOBIN g/dL 11 8* 11 7* 11 3* 11 8* 13 6 15 3   HEMATOCRIT % 35 3* 34 8* 35 2* 36 4* 41 9 48 8   MCV fL 94 93 95 95 97 99*   PLATELETS Thousands/uL 168 142* 127* 130* 147* 189   MCH pg 31 4 31 1 30 6 30 8 31 4 31 1   MCHC g/dL 33 4 33 6 32 1 32 4 32 5 31 4   RDW % 13 7 13 8 13 7 13 9 13 6 13 6   MPV fL 10 9 11 7 11 5 11 1 10 8 11 1   NRBC AUTO /100 WBCs 0 0 0 0  --  0       CMP:  Results from last 7 days   Lab Units 02/04/23  0500 02/03/23  0508 02/02/23  0528 02/01/23  0546 01/31/23  0858   SODIUM mmol/L 139 139 135 136 138   POTASSIUM mmol/L 3 8 3 6 3 4* 3 8 4 3   CHLORIDE mmol/L 109* 107 103 103 105   CO2 mmol/L 23 24 26 29 27   BUN mg/dL 16 17 20 17 13   CREATININE mg/dL 0 64 0 66 0 63 0 68 1 04   CALCIUM mg/dL 8 4 8 5 8 5 8 4 8 8   AST U/L  --   --  53* 46* 39   ALT U/L  --   --  37 41 37   ALK PHOS U/L  --   --  62 66 103   EGFR ml/min/1 73sq m 98 97 99 96 71       BMP:  Results from last 7 days   Lab Units 02/04/23  0500 02/03/23  0508 02/02/23  0528 02/01/23  0546 01/31/23  0858   SODIUM mmol/L 139 139 135 136 138   POTASSIUM mmol/L 3 8 3 6 3 4* 3 8 4 3   CHLORIDE mmol/L 109* 107 103 103 105   CO2 mmol/L 23 24 26 29 27   BUN mg/dL 16 17 20 17 13   CREATININE mg/dL 0 64 0 66 0 63 0 68 1 04   CALCIUM mg/dL 8 4 8 5 8 5 8 4 8 8       NT-proBNP: No results for input(s): NTBNP in the last 72 hours       Magnesium:   Results from last 7 days   Lab Units 02/02/23  0528 02/01/23  0546   MAGNESIUM mg/dL 2 2 2 3       Coags:   Results from last 7 days   Lab Units 02/01/23  0830 01/31/23  2322 01/31/23  1707   PTT seconds 66* 43* 24   INR   --   --  0 98       TSH:        Hemoglobin A1C )  Results from last 7 days   Lab Units 02/01/23  0546   HEMOGLOBIN A1C % 5 4       Lipid Profile:   No results found for: CHOL  Lab Results Component Value Date    HDL 96 02/01/2023     Lab Results   Component Value Date    LDLCALC 87 02/01/2023     Lab Results   Component Value Date    LDLDIRECT 90 02/01/2023     Lab Results   Component Value Date    TRIG 70 02/01/2023       Cardiac testing:   EKG personally reviewed by Shelby Solorio MD      Cath:  ECHO:  Stress TEST:  Other:        Shelby Solorio MD    Portions of the record may have been created with voice recognition software  Occasional wrong word or "sound a like" substitutions may have occurred due to the inherent limitations of voice recognition software  Read the chart carefully and recognize, using context, where substitutions have occurred

## 2023-02-04 NOTE — QUICK NOTE
Contacted by nursing that patient's blood pressure was 80/44  Went to evaluate patient  Patient sleeping comfortably  Woke him on, reports feeling okay  Denies dizziness, blurry vision, headaches, palpitation, chest pain, shortness of breath  Per nursing, patient recently got Coreg and Entresto  On exam, patient slightly bradycardic  Bilateral upper and lower extremities warm  Repeated blood pressure while in the room, 86/51  discussed with nurse to repeat blood pressure in 20 to 30 minutes  Will discuss with the night team     Will discuss with day team and cardiology decreasing Coreg dosing

## 2023-02-05 LAB
BACTERIA BLD CULT: NORMAL
BACTERIA BLD CULT: NORMAL

## (undated) DEVICE — THE TURNPIKE CATHETERS ARE INTENDED TO BE USED TO ACCESS DISCRETE REGIONS OF THE CORONARY AND/OR PERIPHERAL VASCULATURE. THEY MAY BE USED TO FACILITATE PLACEMENT AND EXCHANGE OF GUIDEWIRES AND TO SUBSELECTIVELY INFUSE/DELIVER DIAGNOSTIC AND THERAPEUTIC AGENTS.: Brand: TURNPIKE® CATHETER

## (undated) DEVICE — TREK CORONARY DILATATION CATHETER 3.0 MM X 15 MM / RAPID-EXCHANGE: Brand: TREK

## (undated) DEVICE — RUNTHROUGH NS EXTRA FLOPPY PTCA GUIDEWIRE: Brand: RUNTHROUGH

## (undated) DEVICE — TR BAND RADIAL ARTERY COMPRESSION DEVICE: Brand: TR BAND

## (undated) DEVICE — GUIDEWIRE WHOLEY HI TORQUE INTERM MOD J .035 145CM

## (undated) DEVICE — THE SUPERCROSS MICROCATHETER IS INTENDED TO BE USED IN CONJUNCTION WITH STEERABLE GUIDEWIRES TO ACCESS DISCRETE REGIONS OF THE CORONARY AND/OR PERIPHERAL VASCULATURE. IT MAY BE USED TO FACILITATE PLACEMENT AND EXCHANGE OF GUIDEWIRES AND OTHER INTERVENTIONAL DEVICES AND TO SUBSELECTIVELY INFUSE/DELIVER DIAGNOSTIC AND THERAPEUTIC AGENTS.: Brand: SUPERCROSS™ AT MICROCATHETER

## (undated) DEVICE — CATH GUIDE LAUNCHER 6FR JR4 110CM

## (undated) DEVICE — MINI TREK CORONARY DILATATION CATHETER 2.0 MM X 15 MM / RAPID-EXCHANGE: Brand: MINI TREK

## (undated) DEVICE — RADIFOCUS OPTITORQUE ANGIOGRAPHIC CATHETER: Brand: OPTITORQUE

## (undated) DEVICE — GLIDESHEATH SLENDER STAINLESS STEEL KIT: Brand: GLIDESHEATH SLENDER

## (undated) DEVICE — TREK CORONARY DILATATION CATHETER 2.50 MM X 15 MM / RAPID-EXCHANGE: Brand: TREK

## (undated) DEVICE — CATH BAL SAPPHIRE II PRO 1 X 15MM

## (undated) DEVICE — GUIDEWIRE .035 260CM 3MM J TIP

## (undated) DEVICE — CATH DIAG 6FR IMPULSE 100CM AL1

## (undated) DEVICE — CATH GUIDE LAUNCHER 5FR EBU 3.5

## (undated) DEVICE — HI-TORQUE WHISPER MS GUIDE WIRE .014 STRAIGHT TIP 3.0 CM X 300 CM: Brand: HI-TORQUE WHISPER

## (undated) DEVICE — MINI TREK CORONARY DILATATION CATHETER 2.0 MM X 20 MM / RAPID-EXCHANGE: Brand: MINI TREK

## (undated) DEVICE — GUIDELINER CATHETERS ARE INTENDED TO BE USED IN CONJUNCTION WITH GUIDE CATHETERS TO ACCESS DISCRETE REGIONS OF THE CORONARY AND/OR PERIPHERAL VASCULATURE, AND TO FACILITATE PLACEMENT OF INTERVENTIONAL DEVICES.: Brand: GUIDELINER® V3 CATHETER